# Patient Record
Sex: FEMALE | Race: OTHER | Employment: UNEMPLOYED | URBAN - METROPOLITAN AREA
[De-identification: names, ages, dates, MRNs, and addresses within clinical notes are randomized per-mention and may not be internally consistent; named-entity substitution may affect disease eponyms.]

---

## 2017-02-14 ENCOUNTER — MED REC SCAN ONLY (OUTPATIENT)
Dept: FAMILY MEDICINE CLINIC | Facility: CLINIC | Age: 37
End: 2017-02-14

## 2017-02-20 ENCOUNTER — OFFICE VISIT (OUTPATIENT)
Dept: FAMILY MEDICINE CLINIC | Facility: CLINIC | Age: 37
End: 2017-02-20

## 2017-02-20 VITALS
HEART RATE: 82 BPM | DIASTOLIC BLOOD PRESSURE: 78 MMHG | RESPIRATION RATE: 18 BRPM | OXYGEN SATURATION: 98 % | TEMPERATURE: 98 F | BODY MASS INDEX: 29 KG/M2 | SYSTOLIC BLOOD PRESSURE: 112 MMHG | WEIGHT: 165 LBS

## 2017-02-20 DIAGNOSIS — J06.9 UPPER RESPIRATORY TRACT INFECTION, UNSPECIFIED TYPE: Primary | ICD-10-CM

## 2017-02-20 DIAGNOSIS — Z72.0 TOBACCO USE: ICD-10-CM

## 2017-02-20 PROCEDURE — 99213 OFFICE O/P EST LOW 20 MIN: CPT | Performed by: NURSE PRACTITIONER

## 2017-02-20 RX ORDER — CODEINE PHOSPHATE AND GUAIFENESIN 10; 100 MG/5ML; MG/5ML
SOLUTION ORAL
Qty: 70 ML | Refills: 0 | Status: SHIPPED | OUTPATIENT
Start: 2017-02-20 | End: 2018-04-22 | Stop reason: ALTCHOICE

## 2017-02-20 RX ORDER — AMOXICILLIN AND CLAVULANATE POTASSIUM 875; 125 MG/1; MG/1
1 TABLET, FILM COATED ORAL 2 TIMES DAILY
Qty: 20 TABLET | Refills: 0 | Status: SHIPPED | OUTPATIENT
Start: 2017-02-20 | End: 2017-03-02

## 2017-02-20 RX ORDER — FLUTICASONE PROPIONATE 50 MCG
SPRAY, SUSPENSION (ML) NASAL
Qty: 1 BOTTLE | Refills: 0 | Status: SHIPPED | OUTPATIENT
Start: 2017-02-20 | End: 2018-04-22 | Stop reason: ALTCHOICE

## 2017-02-20 NOTE — PROGRESS NOTES
CHIEF COMPLAINT:   Patient presents with:  Cough: Started 1 week ago. Productive cough, nasal drainage, sinus pressure/congestion, headache, and body aches.        HPI:   Marli Gupta is a 39year old female who presents for sinus symptoms for  1 wee /78 mmHg  Pulse 82  Temp(Src) 98.2 °F (36.8 °C) (Oral)  Resp 18  Wt 165 lb  SpO2 98%  GENERAL: well developed, well nourished, and in no apparent distress  SKIN: no rashes, no suspicious lesions  HEAD: atraumatic, normocephalic, +mild tenderness on p guaiFENesin-codeine (CHERATUSSIN AC) 100-10 MG/5ML Oral Solution 70 mL 0      Sig: Take 5-10 ML PO at QHS PRN cough           Risks, benefits, side effects of medication addressed and explained.   May increase yogurt or start otc probiotic while on antibio Treatment is aimed at unblocking the sinus opening and helping the cilia work again. You may need to take antihistamine and decongestant medicine. These can reduce inflammation and decrease the amount of fluid your sinuses make.  If you have a bacterial inf

## 2017-02-20 NOTE — PATIENT INSTRUCTIONS
Acute Sinusitis    Acute sinusitis is irritation and swelling of the sinuses. It is usually caused by a viral infection after a common cold. Your doctor can help you find relief. What is acute sinusitis?   Sinuses are air-filled spaces in the skull behin © 0977-5609 [address], [address] [place]. All rights reserved. This information is not intended as a substitute for professional medical care. Always follow your healthcare professional's instructions.

## 2018-04-22 ENCOUNTER — OFFICE VISIT (OUTPATIENT)
Dept: FAMILY MEDICINE CLINIC | Facility: CLINIC | Age: 38
End: 2018-04-22

## 2018-04-22 VITALS
WEIGHT: 167 LBS | SYSTOLIC BLOOD PRESSURE: 124 MMHG | HEART RATE: 102 BPM | OXYGEN SATURATION: 97 % | HEIGHT: 63 IN | BODY MASS INDEX: 29.59 KG/M2 | TEMPERATURE: 99 F | DIASTOLIC BLOOD PRESSURE: 70 MMHG | RESPIRATION RATE: 16 BRPM

## 2018-04-22 DIAGNOSIS — L08.9 SKIN INFECTION: Primary | ICD-10-CM

## 2018-04-22 PROCEDURE — 99213 OFFICE O/P EST LOW 20 MIN: CPT | Performed by: NURSE PRACTITIONER

## 2018-04-22 RX ORDER — IBUPROFEN 200 MG
200 TABLET ORAL EVERY 6 HOURS PRN
COMMUNITY

## 2018-04-22 RX ORDER — CLINDAMYCIN HYDROCHLORIDE 300 MG/1
300 CAPSULE ORAL 3 TIMES DAILY
Qty: 30 CAPSULE | Refills: 0 | Status: SHIPPED | OUTPATIENT
Start: 2018-04-22 | End: 2018-05-02

## 2018-04-22 NOTE — PROGRESS NOTES
CHIEF COMPLAINT:   Patient presents with:  Bite Sting,Insect (integumentary): 3 days ago, left calf, redness/soreness      HPI:     Juan Gerard is a 43year old female who presents with concerns of possible skin infection to left calf, possible spi fluctuance, no drainage, no warmth to touch  HEAD: atraumatic, normocephalic  EYES: conjunctiva clear, EOM intact  NOSE: Normal external nose. No rhinorrhea. NECK: supple, non-tender  LUNGS: clear to auscultation bilaterally, no wheezes or rhonchi.  Breat

## 2019-03-19 ENCOUNTER — OFFICE VISIT (OUTPATIENT)
Dept: FAMILY MEDICINE CLINIC | Facility: CLINIC | Age: 39
End: 2019-03-19

## 2019-03-19 VITALS
OXYGEN SATURATION: 98 % | HEIGHT: 64 IN | DIASTOLIC BLOOD PRESSURE: 68 MMHG | BODY MASS INDEX: 27.69 KG/M2 | SYSTOLIC BLOOD PRESSURE: 114 MMHG | RESPIRATION RATE: 19 BRPM | HEART RATE: 81 BPM | TEMPERATURE: 98 F | WEIGHT: 162.19 LBS

## 2019-03-19 DIAGNOSIS — J01.10 ACUTE NON-RECURRENT FRONTAL SINUSITIS: Primary | ICD-10-CM

## 2019-03-19 DIAGNOSIS — J40 BRONCHITIS: ICD-10-CM

## 2019-03-19 PROCEDURE — 99213 OFFICE O/P EST LOW 20 MIN: CPT | Performed by: NURSE PRACTITIONER

## 2019-03-19 RX ORDER — CODEINE PHOSPHATE AND GUAIFENESIN 10; 100 MG/5ML; MG/5ML
5 SOLUTION ORAL NIGHTLY PRN
Qty: 100 ML | Refills: 0 | Status: SHIPPED | OUTPATIENT
Start: 2019-03-19 | End: 2019-04-02

## 2019-03-19 RX ORDER — PREDNISONE 20 MG/1
40 TABLET ORAL DAILY
Qty: 10 TABLET | Refills: 0 | Status: SHIPPED | OUTPATIENT
Start: 2019-03-19 | End: 2019-03-24

## 2019-03-19 RX ORDER — AMOXICILLIN AND CLAVULANATE POTASSIUM 875; 125 MG/1; MG/1
1 TABLET, FILM COATED ORAL 2 TIMES DAILY
Qty: 14 TABLET | Refills: 0 | Status: SHIPPED | OUTPATIENT
Start: 2019-03-19 | End: 2019-03-26

## 2019-03-19 RX ORDER — BENZONATATE 200 MG/1
200 CAPSULE ORAL 3 TIMES DAILY PRN
Qty: 60 CAPSULE | Refills: 0 | Status: SHIPPED | OUTPATIENT
Start: 2019-03-19

## 2019-03-19 RX ORDER — ALBUTEROL SULFATE 90 UG/1
2 AEROSOL, METERED RESPIRATORY (INHALATION) EVERY 4 HOURS PRN
Qty: 1 INHALER | Refills: 6 | Status: SHIPPED | OUTPATIENT
Start: 2019-03-19

## 2019-03-19 RX ORDER — CODEINE PHOSPHATE AND GUAIFENESIN 10; 100 MG/5ML; MG/5ML
5 SOLUTION ORAL NIGHTLY PRN
Qty: 100 ML | Refills: 0 | Status: SHIPPED | OUTPATIENT
Start: 2019-03-19 | End: 2019-03-19

## 2019-03-19 NOTE — PROGRESS NOTES
Patient presents with:  Nasal Congestion: tylenol , mucinex, ibuprofen   Cough: x 1 month      HPI:   Kenney Cristobal is a 43year old female who presents for cough  for 1 month. Patient cough started gradually, tight, wheezy,deep.  Reports is  worse at wet- cough, increase expiration phase to inspiratory phase. Expiratory wheezing, no rales, no crackles. Normal on percussion. No decreased BS. Normal on palpation,normal vocal fremitus.   CARDIO: RRR without murmur  GI: good BS's,no masses, HSM or tenderness

## 2019-03-19 NOTE — PATIENT INSTRUCTIONS
Viral or Bacterial Bronchitis with Wheezing (Adult)    Bronchitis is an infection of the air passages. It often occurs during a cold and is usually caused by a virus. Symptoms include cough with mucus (phlegm) and low-grade fever.  This illness is contagi · Over-the-counter cough, cold, and sore-throat medicines will not shorten the length of the illness, but they may be helpful to reduce symptoms.  (Note: Do not use decongestants if you have high blood pressure.)  · If you were given an inhaler, use it exac The sinuses are air-filled spaces within the bones of the face. They connect to the inside of the nose. Sinusitis is an inflammation of the tissue that lines the sinuses. Sinusitis can occur during a cold.  It can also happen due to allergies to pollens and · Do not use nasal rinses or irrigation during an acute sinus infection, unless your healthcare provider tells you to. Rinsing may spread the infection to other areas in your sinuses.   · Use acetaminophen or ibuprofen to control pain, unless another pain m

## 2021-10-06 ENCOUNTER — OFFICE VISIT (OUTPATIENT)
Dept: FAMILY MEDICINE CLINIC | Facility: CLINIC | Age: 41
End: 2021-10-06
Payer: MEDICAID

## 2021-10-06 VITALS
DIASTOLIC BLOOD PRESSURE: 80 MMHG | TEMPERATURE: 98 F | WEIGHT: 184 LBS | HEIGHT: 64 IN | SYSTOLIC BLOOD PRESSURE: 130 MMHG | HEART RATE: 94 BPM | OXYGEN SATURATION: 100 % | BODY MASS INDEX: 31.41 KG/M2

## 2021-10-06 DIAGNOSIS — M25.511 ACUTE PAIN OF RIGHT SHOULDER: Primary | ICD-10-CM

## 2021-10-06 PROCEDURE — 3079F DIAST BP 80-89 MM HG: CPT | Performed by: PHYSICIAN ASSISTANT

## 2021-10-06 PROCEDURE — 3008F BODY MASS INDEX DOCD: CPT | Performed by: PHYSICIAN ASSISTANT

## 2021-10-06 PROCEDURE — 3075F SYST BP GE 130 - 139MM HG: CPT | Performed by: PHYSICIAN ASSISTANT

## 2021-10-06 PROCEDURE — 99213 OFFICE O/P EST LOW 20 MIN: CPT | Performed by: PHYSICIAN ASSISTANT

## 2021-10-06 NOTE — PATIENT INSTRUCTIONS
Patient Declined AVS    Verbal Instructions given      1. OTC pain relief  2. Follow up with orthopedics ASAP  3.  If worsening symptoms seek treatment

## 2021-10-06 NOTE — PROGRESS NOTES
CHIEF COMPLAINT:     No chief complaint on file. HPI:   Roselia Lane is a 42year old female who presents with complaints of right shoulder pain for the past 4 months. The patient denies any specific injury.   The patient reports the pain is al (Skin)   Ht 5' 4\" (1.626 m)   Wt 184 lb (83.5 kg)   SpO2 100%   BMI 31.58 kg/m²   GENERAL: well developed, well nourished,in no apparent distress, cooperative   NECK: Non tender with normal ROM.   LUNGS: clear to auscultation bilaterally, no wheezes or rho

## 2024-10-26 ENCOUNTER — APPOINTMENT (OUTPATIENT)
Dept: GENERAL RADIOLOGY | Age: 44
End: 2024-10-26
Payer: MEDICAID

## 2024-10-26 ENCOUNTER — HOSPITAL ENCOUNTER (EMERGENCY)
Age: 44
Discharge: HOME | End: 2024-10-26
Attending: EMERGENCY MEDICINE
Payer: MEDICAID

## 2024-10-26 ENCOUNTER — APPOINTMENT (OUTPATIENT)
Dept: CT IMAGING | Age: 44
End: 2024-10-26
Attending: PHYSICIAN ASSISTANT
Payer: MEDICAID

## 2024-10-26 VITALS
BODY MASS INDEX: 30.11 KG/M2 | OXYGEN SATURATION: 96 % | DIASTOLIC BLOOD PRESSURE: 79 MMHG | HEIGHT: 64 IN | WEIGHT: 176.38 LBS | TEMPERATURE: 98 F | HEART RATE: 64 BPM | SYSTOLIC BLOOD PRESSURE: 119 MMHG | RESPIRATION RATE: 16 BRPM

## 2024-10-26 DIAGNOSIS — M54.50 ACUTE RIGHT-SIDED LOW BACK PAIN WITHOUT SCIATICA: Primary | ICD-10-CM

## 2024-10-26 DIAGNOSIS — N30.00 ACUTE CYSTITIS WITHOUT HEMATURIA: ICD-10-CM

## 2024-10-26 DIAGNOSIS — N20.0 KIDNEY STONE: ICD-10-CM

## 2024-10-26 LAB
ALBUMIN SERPL-MCNC: 4.1 G/DL (ref 3.4–5)
ALBUMIN/GLOB SERPL: 1 {RATIO} (ref 1–2)
ALP LIVER SERPL-CCNC: 141 U/L
ALT SERPL-CCNC: 47 U/L
ANION GAP SERPL CALC-SCNC: 4 MMOL/L (ref 0–18)
AST SERPL-CCNC: 20 U/L (ref 15–37)
B-HCG UR QL: NEGATIVE
BASOPHILS # BLD AUTO: 0.06 X10(3) UL (ref 0–0.2)
BASOPHILS NFR BLD AUTO: 0.8 %
BILIRUB SERPL-MCNC: 0.6 MG/DL (ref 0.1–2)
BILIRUB UR QL STRIP.AUTO: NEGATIVE
BUN BLD-MCNC: 10 MG/DL (ref 9–23)
CALCIUM BLD-MCNC: 9.8 MG/DL (ref 8.5–10.1)
CHLORIDE SERPL-SCNC: 99 MMOL/L (ref 98–112)
CLARITY UR REFRACT.AUTO: CLEAR
CO2 SERPL-SCNC: 31 MMOL/L (ref 21–32)
COLOR UR AUTO: YELLOW
CREAT BLD-MCNC: 0.84 MG/DL
EGFRCR SERPLBLD CKD-EPI 2021: 87 ML/MIN/1.73M2 (ref 60–?)
EOSINOPHIL # BLD AUTO: 0.3 X10(3) UL (ref 0–0.7)
EOSINOPHIL NFR BLD AUTO: 4.2 %
ERYTHROCYTE [DISTWIDTH] IN BLOOD BY AUTOMATED COUNT: 12.4 %
GLOBULIN PLAS-MCNC: 4.1 G/DL (ref 2.8–4.4)
GLUCOSE BLD-MCNC: 186 MG/DL (ref 70–99)
GLUCOSE UR STRIP.AUTO-MCNC: NEGATIVE MG/DL
HCT VFR BLD AUTO: 44.3 %
HGB BLD-MCNC: 15.3 G/DL
IMM GRANULOCYTES # BLD AUTO: 0.02 X10(3) UL (ref 0–1)
IMM GRANULOCYTES NFR BLD: 0.3 %
KETONES UR STRIP.AUTO-MCNC: NEGATIVE MG/DL
LYMPHOCYTES # BLD AUTO: 2.47 X10(3) UL (ref 1–4)
LYMPHOCYTES NFR BLD AUTO: 34.4 %
MCH RBC QN AUTO: 29.8 PG (ref 26–34)
MCHC RBC AUTO-ENTMCNC: 34.5 G/DL (ref 31–37)
MCV RBC AUTO: 86.4 FL
MONOCYTES # BLD AUTO: 0.53 X10(3) UL (ref 0.1–1)
MONOCYTES NFR BLD AUTO: 7.4 %
NEUTROPHILS # BLD AUTO: 3.8 X10 (3) UL (ref 1.5–7.7)
NEUTROPHILS # BLD AUTO: 3.8 X10(3) UL (ref 1.5–7.7)
NEUTROPHILS NFR BLD AUTO: 52.9 %
NITRITE UR QL STRIP.AUTO: POSITIVE
OSMOLALITY SERPL CALC.SUM OF ELEC: 282 MOSM/KG (ref 275–295)
PH UR STRIP.AUTO: 6.5 [PH] (ref 5–8)
PLATELET # BLD AUTO: 238 10(3)UL (ref 150–450)
POTASSIUM SERPL-SCNC: 3.8 MMOL/L (ref 3.5–5.1)
PROT SERPL-MCNC: 8.2 G/DL (ref 6.4–8.2)
PROT UR STRIP.AUTO-MCNC: NEGATIVE MG/DL
RBC # BLD AUTO: 5.13 X10(6)UL
RBC UR QL AUTO: NEGATIVE
SODIUM SERPL-SCNC: 134 MMOL/L (ref 136–145)
SP GR UR STRIP.AUTO: 1.01 (ref 1–1.03)
UROBILINOGEN UR STRIP.AUTO-MCNC: 0.2 MG/DL
WBC # BLD AUTO: 7.2 X10(3) UL (ref 4–11)

## 2024-10-26 PROCEDURE — 96361 HYDRATE IV INFUSION ADD-ON: CPT

## 2024-10-26 PROCEDURE — 80053 COMPREHEN METABOLIC PANEL: CPT | Performed by: PHYSICIAN ASSISTANT

## 2024-10-26 PROCEDURE — 87086 URINE CULTURE/COLONY COUNT: CPT | Performed by: PHYSICIAN ASSISTANT

## 2024-10-26 PROCEDURE — 72110 X-RAY EXAM L-2 SPINE 4/>VWS: CPT | Performed by: EMERGENCY MEDICINE

## 2024-10-26 PROCEDURE — 87186 SC STD MICRODIL/AGAR DIL: CPT | Performed by: PHYSICIAN ASSISTANT

## 2024-10-26 PROCEDURE — 96375 TX/PRO/DX INJ NEW DRUG ADDON: CPT

## 2024-10-26 PROCEDURE — 81015 MICROSCOPIC EXAM OF URINE: CPT | Performed by: PHYSICIAN ASSISTANT

## 2024-10-26 PROCEDURE — 85025 COMPLETE CBC W/AUTO DIFF WBC: CPT | Performed by: PHYSICIAN ASSISTANT

## 2024-10-26 PROCEDURE — 81025 URINE PREGNANCY TEST: CPT

## 2024-10-26 PROCEDURE — 87077 CULTURE AEROBIC IDENTIFY: CPT | Performed by: PHYSICIAN ASSISTANT

## 2024-10-26 PROCEDURE — 96365 THER/PROPH/DIAG IV INF INIT: CPT

## 2024-10-26 PROCEDURE — 99284 EMERGENCY DEPT VISIT MOD MDM: CPT

## 2024-10-26 PROCEDURE — 99285 EMERGENCY DEPT VISIT HI MDM: CPT

## 2024-10-26 PROCEDURE — 72110 X-RAY EXAM L-2 SPINE 4/>VWS: CPT

## 2024-10-26 PROCEDURE — 74176 CT ABD & PELVIS W/O CONTRAST: CPT | Performed by: PHYSICIAN ASSISTANT

## 2024-10-26 PROCEDURE — 81001 URINALYSIS AUTO W/SCOPE: CPT | Performed by: PHYSICIAN ASSISTANT

## 2024-10-26 RX ORDER — HYDROCODONE BITARTRATE AND ACETAMINOPHEN 5; 325 MG/1; MG/1
1-2 TABLET ORAL EVERY 6 HOURS PRN
Qty: 10 TABLET | Refills: 0 | Status: SHIPPED | OUTPATIENT
Start: 2024-10-26

## 2024-10-26 RX ORDER — HYDROMORPHONE HYDROCHLORIDE 1 MG/ML
INJECTION, SOLUTION INTRAMUSCULAR; INTRAVENOUS; SUBCUTANEOUS EVERY 30 MIN PRN
Status: DISCONTINUED | OUTPATIENT
Start: 2024-10-26 | End: 2024-10-27

## 2024-10-26 RX ORDER — HYDROMORPHONE HYDROCHLORIDE 1 MG/ML
0.5 INJECTION, SOLUTION INTRAMUSCULAR; INTRAVENOUS; SUBCUTANEOUS ONCE
Status: COMPLETED | OUTPATIENT
Start: 2024-10-26 | End: 2024-10-26

## 2024-10-26 RX ORDER — CEFPODOXIME PROXETIL 200 MG/1
200 TABLET, FILM COATED ORAL 2 TIMES DAILY
Qty: 20 TABLET | Refills: 0 | Status: SHIPPED | OUTPATIENT
Start: 2024-10-26 | End: 2024-11-05

## 2024-10-26 RX ORDER — ONDANSETRON 2 MG/ML
4 INJECTION INTRAMUSCULAR; INTRAVENOUS EVERY 4 HOURS PRN
Status: DISCONTINUED | OUTPATIENT
Start: 2024-10-26 | End: 2024-10-27

## 2024-10-26 RX ORDER — KETOROLAC TROMETHAMINE 30 MG/ML
30 INJECTION, SOLUTION INTRAMUSCULAR; INTRAVENOUS ONCE
Status: COMPLETED | OUTPATIENT
Start: 2024-10-26 | End: 2024-10-26

## 2024-10-27 NOTE — ED PROVIDER NOTES
Patient Seen in: [facility] In [place]      History     Chief Complaint   Patient presents with    Back Pain     Stated Complaint: right flank / back pain, worse with movement    Subjective:   HPI    45 YO female presents to emergency department for evaluation of right low back pain that radiates to right flank starting 2 days ago. Worse with movement. Reports history of kidney stones but states recent pain is not as severe as that. Denies urinary symptoms or fever. Ibuprofen 800 mg has not helped.         Objective:     History reviewed. No pertinent past medical history.           Past Surgical History:   Procedure Laterality Date    Hysterectomy  2009                Social History     Socioeconomic History    Marital status:    Tobacco Use    Smoking status: Some Days    Smokeless tobacco: Never   Substance and Sexual Activity    Alcohol use: Yes    Drug use: No                  Physical Exam     ED Triage Vitals [10/26/24 1752]   BP (!) 164/106   Pulse 90   Resp 18   Temp 98.2 °F (36.8 °C)   Temp src Oral   SpO2 100 %   O2 Device None (Room air)       Current Vitals:   No data recorded      Physical Exam  Vitals and nursing note reviewed.   Constitutional:       General: She is not in acute distress.     Appearance: Normal appearance. She is not ill-appearing, toxic-appearing or diaphoretic.   Cardiovascular:      Rate and Rhythm: Normal rate.   Pulmonary:      Effort: Pulmonary effort is normal. No respiratory distress.   Abdominal:      Palpations: Abdomen is soft.      Tenderness: There is no abdominal tenderness. There is no guarding.   Musculoskeletal:      Lumbar back: Tenderness (right) present.   Neurological:      Mental Status: She is alert and oriented to person, place, and time.   Psychiatric:         Behavior: Behavior normal.          ED Course     Labs Reviewed   COMP METABOLIC PANEL (14) - Abnormal; Notable for the following components:       Result Value    Glucose  186 (*)     Sodium 134 (*)     Alkaline Phosphatase 141 (*)     All other components within normal limits   URINALYSIS WITH CULTURE REFLEX - Abnormal; Notable for the following components:    Nitrite Urine Positive (*)     Leukocyte Esterase Urine Small (*)     All other components within normal limits   UA MICROSCOPIC ONLY, URINE - Abnormal; Notable for the following components:    WBC Urine 6-10 (*)     Bacteria Urine 3+ (*)     Squamous Epi. Cells Few (*)     All other components within normal limits   URINE CULTURE, ROUTINE - Abnormal; Notable for the following components:    Urine Culture >100,000 CFU/ML Klebsiella pneumoniae (*)     All other components within normal limits   POCT PREGNANCY URINE - Normal   CBC WITH DIFFERENTIAL WITH PLATELET     CT ABDOMEN+PELVIS KIDNEYSTONE 2D RNDR(NO IV,NO ORAL)(CPT=74176)    Result Date: 10/26/2024  PROCEDURE:  CT ABDOMEN+PELVIS KIDNEYSTONE 2D RNDR(NO IV,NO ORAL)(CPT=74176)  COMPARISON:  [facility], XR, XR LUMBAR SPINE (MIN 4 VIEWS) (CPT=72110), 10/26/2024, 6:46 PM.  INDICATIONS:  right flank / back pain, worse with movement  TECHNIQUE:  Unenhanced multislice CT scanning from above the kidneys to below the urinary bladder.  2D rendering are generated on the CT scanner workstation to localize potential stones in the cranio-caudal plane.  Dose reduction techniques were used. Dose information is transmitted to the ACR (American College of Radiology) NRDR (National Radiology Data Registry) which includes the Dose Index Registry.  PATIENT STATED HISTORY: (As transcribed by Technologist)   right flank / back pain.    FINDINGS:  KIDNEYS:  No hydronephrosis.  There is a 2 mm nonobstructing calculus midpole left kidney.  No definite mass on this study without contrast BLADDER:  The bladder is empty. ADRENALS:  No mass or enlargement.  LIVER:  No enlargement, atrophy, abnormal density, or significant focal lesion.  BILIARY:  No visible dilatation or calcification.  PANCREAS:  No  lesion, fluid collection, ductal dilatation, or atrophy.  SPLEEN:  No enlargement or focal lesion.  AORTA/VASCULAR:  No aneurysm.  RETROPERITONEUM:  No mass or adenopathy.  BOWEL/MESENTERY:  No free air.  No free fluid.  No bowel obstruction.  There is some gaseous distension of the transverse colon and some stool in the right colon moderate in degree.  The terminal ileum is unremarkable.  There is some mild wall thickening  involving the proximal to mid jejunum may represent enteritis.  The appendix is normal. ABDOMINAL WALL:  No mass or hernia.  BONES:  No bony lesion or fracture. PELVIC ORGANS:  Status post hysterectomy LUNG BASES:  No visible pulmonary or pleural disease.  OTHER:  Negative.             CONCLUSION:  1. There is a 2 mm nonobstructing calculus midpole left kidney.  No hydronephrosis.  The bladder is empty.  2. Mild circumferential wall thickening of the jejunum without transition zone suggesting enteritis.  There is mild gaseous distension of the transverse colon with a moderate amount of retained stool in the right colon may represent associated ileus/enteritis.  Normal appendix.     LOCATION:  [facility]   Dictated by (CST): Freddy Youssef MD on 10/26/2024 at 9:54 PM     Finalized by (CST): Freddy Youssef MD on 10/26/2024 at 10:00 PM       XR LUMBAR SPINE (MIN 4 VIEWS) (CPT=72110)    Result Date: 10/26/2024  PROCEDURE:  XR LUMBAR SPINE (MIN 4 VIEWS) (CPT=72110)  TECHNIQUE:  AP, lateral, oblique, and coned down L5-S1 views were obtained.  COMPARISON:  None.  INDICATIONS:  right flank / back pain, worse with movement  PATIENT STATED HISTORY: (As transcribed by Technologist)  Pt c/o right sided lower lumbar pain worse when moving. Pt denies any recent injury or trauma.    FINDINGS:    BONES:  Normal.  No significant spondylosis, scoliosis, fracture, or visible bony lesion. DISC SPACES:  Normal.  No significant disc height narrowing, subluxation, or endplate abnormality. PARASPINOUS:  Negative.   No paraspinous abnormality is seen. OTHER:  Moderate amount of stool in the right and proximal transverse colon with mild gaseous distension of the distal transverse colon and proximal left colon.            CONCLUSION:  No fracture.  Moderate amount of retained stool.   LOCATION:  [facility]   Dictated by (CST): Freddy Youssef MD on 10/26/2024 at 7:23 PM     Finalized by (CST): Freddy Youssef MD on 10/26/2024 at 7:24 PM           MDM      Differential diagnosis considered but not limited to lumbar strain, nephrolithiasis, less likely pyelonephritis    Afebrile and non-toxic in appearance. Mechanical right low back pain. No midline tenderness. XR lumbar spine ordered in triage is negative for acute osseous findings. There is a moderate amount of retained stool in the right and proximal transverse colon.   UA has small leukocyte esterase and positive nitrites.  No blood.    CT abdomen/pelvis remarkable for 2 mm calculus at the left kidney that is nonobstructing.  Mild gaseous distention of the transverse colon with a moderate amount of retained stool in the right colon may represent associated ileus/enteritis.  Normal appendix.  All results were discussed with patient. Subjective improvement after IV fluids, Toradol then Dilaudid.  She received 2 g Rocephin IV here and discharged with Cefpodoxime for acute cystitis. Urine culture pending.         Medical Decision Making  Amount and/or Complexity of Data Reviewed  Labs: ordered. Decision-making details documented in ED Course.  Radiology: ordered and independent interpretation performed. Decision-making details documented in ED Course.        Disposition and Plan     Clinical Impression:  1. Acute right-sided low back pain without sciatica    2. Kidney stone    3. Acute cystitis without hematuria         Disposition:  Discharge  10/26/2024 11:01 pm    Follow-up:  [facility] in [place]  [id number] [place] [address]  [id number]  [phone]  Follow up  If symptoms worsen    Warren Arceo MD  [address] [place] [id number]  [phone]    Schedule an appointment as soon as possible for a visit            Medications Prescribed:  Discharge Medication List as of 10/26/2024 11:14 PM        START taking these medications    Details   cefpodoxime 200 MG Oral Tab Take 1 tablet (200 mg total) by mouth 2 (two) times daily for 10 days., Normal, Disp-20 tablet, R-0      HYDROcodone-acetaminophen 5-325 MG Oral Tab Take 1-2 tablets by mouth every 6 (six) hours as needed for Pain., Normal, Disp-10 tablet, R-0                 Supplementary Documentation:

## 2024-11-12 ENCOUNTER — OFFICE VISIT (OUTPATIENT)
Dept: FAMILY MEDICINE CLINIC | Facility: CLINIC | Age: 44
End: 2024-11-12
Payer: MEDICAID

## 2024-11-12 VITALS
SYSTOLIC BLOOD PRESSURE: 106 MMHG | OXYGEN SATURATION: 99 % | DIASTOLIC BLOOD PRESSURE: 82 MMHG | HEART RATE: 99 BPM | TEMPERATURE: 99 F | WEIGHT: 177 LBS | HEIGHT: 64 IN | BODY MASS INDEX: 30.22 KG/M2 | RESPIRATION RATE: 16 BRPM

## 2024-11-12 DIAGNOSIS — M54.50 ACUTE RIGHT-SIDED LOW BACK PAIN WITHOUT SCIATICA: Primary | ICD-10-CM

## 2024-11-12 LAB
APPEARANCE: CLEAR
BILIRUBIN: NEGATIVE
GLUCOSE (URINE DIPSTICK): NEGATIVE MG/DL
KETONES (URINE DIPSTICK): NEGATIVE MG/DL
LEUKOCYTES: NEGATIVE
MULTISTIX LOT#: NORMAL NUMERIC
NITRITE, URINE: NEGATIVE
OCCULT BLOOD: NEGATIVE
PH, URINE: 7 (ref 4.5–8)
PROTEIN (URINE DIPSTICK): NEGATIVE MG/DL
SPECIFIC GRAVITY: 1.02 (ref 1–1.03)
URINE-COLOR: YELLOW
UROBILINOGEN,SEMI-QN: 1 MG/DL (ref 0–1.9)

## 2024-11-12 PROCEDURE — 99203 OFFICE O/P NEW LOW 30 MIN: CPT | Performed by: NURSE PRACTITIONER

## 2024-11-12 PROCEDURE — 81003 URINALYSIS AUTO W/O SCOPE: CPT | Performed by: NURSE PRACTITIONER

## 2024-11-12 NOTE — PROGRESS NOTES
CHIEF COMPLAINT:     Chief Complaint   Patient presents with    Urinary Symptoms     C/o low back R side pain, finished abx prescribed 10/26/24 last week Tuesday   Denies urinary sx        HPI:   Gaby Childs is a 44 year old female who is here for complaints of right sided low back pain. Pain started 3-4 days ago, was worse yesterday, better today. Started after lifting something over head. Has increased pain with lifting, forward bend, and twisting. Pain is aching and currently rates as 4/10 in severity.   Pt was concerned as she was in ED on 10.26, diagnosed with urinary tract infection, started on antibiotic. She had right sided back pain at that time as well. She states the back pain seemed to improve after antibiotic, but then returned 3-4 days ago. Was uncertain if the urinary tract infection was returning or if the pain was due to muscular etiology. She had previously been treating the pain with ibuprofen, but has not used anything for pain the past few days. Denies any urinary frequency, urgency, hematuria, or dysuria.   Denies janel loss of bowel or bladder control.    Current Outpatient Medications   Medication Sig Dispense Refill    HYDROcodone-acetaminophen 5-325 MG Oral Tab Take 1-2 tablets by mouth every 6 (six) hours as needed for Pain. 10 tablet 0    benzonatate 200 MG Oral Cap Take 1 capsule (200 mg total) by mouth 3 (three) times daily as needed for cough. (Patient not taking: Reported on 10/26/2024) 60 capsule 0    Albuterol Sulfate HFA (PROAIR HFA) 108 (90 Base) MCG/ACT Inhalation Aero Soln Inhale 2 puffs into the lungs every 4 (four) hours as needed for Wheezing. (Patient not taking: Reported on 10/26/2024) 1 Inhaler 6    ibuprofen 200 MG Oral Tab Take 1 tablet (200 mg total) by mouth every 6 (six) hours as needed for Pain.        No past medical history on file.   Social History:  Social History     Socioeconomic History    Marital status:    Tobacco Use    Smoking status: Some  Days    Smokeless tobacco: Never   Substance and Sexual Activity    Alcohol use: Yes    Drug use: No        REVIEW OF SYSTEMS:   GENERAL: feels well otherwise  SKIN: denies any unusual skin lesions  LUNGS: denies shortness of breath   CARDIOVASCULAR: denies chest pain or palpitations  GI: denies abdominal pain, N/VC/D.  Denies heartburn  : no dysuria, urgency or flank pain.  MUSCULOSKELETAL: Per HPI.  No other joints are affected  NEURO: No numbness or tingling.  No loss of bowel or bladder control.    EXAM:   /82   Pulse 99   Temp 98.8 °F (37.1 °C)   Resp 16   Ht 5' 4\" (1.626 m)   Wt 177 lb (80.3 kg)   SpO2 99%   BMI 30.38 kg/m²    GENERAL: well developed, well nourished,in no apparent distress  SKIN: no rashes,no suspicious lesions  NECK: supple,no adenopathy,no bruits  LUNGS: clear to auscultation  CARDIO: RRR without murmur  GI: normoactive bs x4, no masses, HSM or tenderness  EXTREMITIES: no cyanosis, clubbing or edema  BACK: + tenderness to palpation of right lumbar sacral area. Full active ROM of torso. Increased pain with rightward twisting and forward bend.   NEURO: bilateral patellar DTR's intact and equal bilaterally.  Sensation intact.    Recent Results (from the past 24 hours)   URINALYSIS, AUTO, W/O SCOPE    Collection Time: 11/12/24  4:31 PM   Result Value Ref Range    Glucose Urine Negative Negative mg/dL    Bilirubin Urine Negative Negative    Ketones, UA Negative Negative - Trace mg/dL    Spec Gravity 1.025 1.005 - 1.030    Blood Urine Negative Negative    PH Urine 7.0 5.0 - 8.0    Protein Urine Negative Negative - Trace mg/dL    Urobilinogen Urine 1.0 0.2 - 1.0 mg/dL    Nitrite Urine Negative Negative    Leukocyte Esterase Urine Negative Negative    APPEARANCE clear Clear    Color Urine yellow Yellow    Multistix Lot# [id number] Numeric    Multistix Expiration Date 5/31/25 Date      ASSESSMENT:   Gaby Childs is a 44 year old female who presents with complaints of back pain    Findings are consistent with   Encounter Diagnosis   Name Primary?    Acute right-sided low back pain without sciatica Yes         PLAN:   UA negative. Exam findings consistent with musculoskeletal etiology. Discussed comfort measures. Advised follow up with pcp if not improving over the next week. May benefit from PT as well.   Advised to seek urgent follow up for any new/ worsening symptoms  Requested Prescriptions      No prescriptions requested or ordered in this encounter     Risks, benefits, side effects of medication explained and discussed.     Patient Instructions   May continue ibuprofen for pain if needed  Heat to the affected area for 10-15 minutes 3 times daily may be helpful.   Follow up with your primary care doctor if not improving over the next week  Seek urgent follow up for new/ worsening symptoms    The patient indicates understanding of these issues and agrees to the plan.  The patient is asked to return if sx's persist or worsen.

## 2024-11-12 NOTE — PATIENT INSTRUCTIONS
May continue ibuprofen for pain if needed  Heat to the affected area for 10-15 minutes 3 times daily may be helpful.   Follow up with your primary care doctor if not improving over the next week  Seek urgent follow up for new/ worsening symptoms

## 2025-01-22 ENCOUNTER — OFFICE VISIT (OUTPATIENT)
Dept: INTERNAL MEDICINE CLINIC | Facility: CLINIC | Age: 45
End: 2025-01-22
Payer: MEDICAID

## 2025-01-22 ENCOUNTER — LAB ENCOUNTER (OUTPATIENT)
Dept: LAB | Age: 45
End: 2025-01-22
Attending: INTERNAL MEDICINE
Payer: MEDICAID

## 2025-01-22 VITALS
DIASTOLIC BLOOD PRESSURE: 80 MMHG | RESPIRATION RATE: 16 BRPM | BODY MASS INDEX: 30.94 KG/M2 | TEMPERATURE: 98 F | HEART RATE: 77 BPM | OXYGEN SATURATION: 98 % | WEIGHT: 176.81 LBS | SYSTOLIC BLOOD PRESSURE: 130 MMHG | HEIGHT: 63.5 IN

## 2025-01-22 DIAGNOSIS — Z00.00 LABORATORY EXAM ORDERED AS PART OF ROUTINE GENERAL MEDICAL EXAMINATION: ICD-10-CM

## 2025-01-22 DIAGNOSIS — R73.01 ELEVATED FASTING GLUCOSE: ICD-10-CM

## 2025-01-22 DIAGNOSIS — Z00.00 ENCOUNTER FOR ROUTINE ADULT MEDICAL EXAMINATION: Primary | ICD-10-CM

## 2025-01-22 DIAGNOSIS — Z12.31 ENCOUNTER FOR SCREENING MAMMOGRAM FOR BREAST CANCER: ICD-10-CM

## 2025-01-22 DIAGNOSIS — G47.00 INSOMNIA, UNSPECIFIED TYPE: ICD-10-CM

## 2025-01-22 DIAGNOSIS — Z12.11 SCREENING FOR COLON CANCER: ICD-10-CM

## 2025-01-22 DIAGNOSIS — Z23 NEED FOR TDAP VACCINATION: ICD-10-CM

## 2025-01-22 LAB
ALBUMIN SERPL-MCNC: 4.9 G/DL (ref 3.2–4.8)
ALBUMIN/GLOB SERPL: 1.6 {RATIO} (ref 1–2)
ALP LIVER SERPL-CCNC: 142 U/L
ALT SERPL-CCNC: 32 U/L
ANION GAP SERPL CALC-SCNC: 8 MMOL/L (ref 0–18)
AST SERPL-CCNC: 23 U/L (ref ?–34)
BASOPHILS # BLD AUTO: 0.06 X10(3) UL (ref 0–0.2)
BASOPHILS NFR BLD AUTO: 1 %
BILIRUB SERPL-MCNC: 0.5 MG/DL (ref 0.3–1.2)
BUN BLD-MCNC: 12 MG/DL (ref 9–23)
CALCIUM BLD-MCNC: 10.2 MG/DL (ref 8.7–10.6)
CHLORIDE SERPL-SCNC: 99 MMOL/L (ref 98–112)
CHOLEST SERPL-MCNC: 250 MG/DL (ref ?–200)
CO2 SERPL-SCNC: 29 MMOL/L (ref 21–32)
CREAT BLD-MCNC: 0.86 MG/DL
EGFRCR SERPLBLD CKD-EPI 2021: 85 ML/MIN/1.73M2 (ref 60–?)
EOSINOPHIL # BLD AUTO: 0.28 X10(3) UL (ref 0–0.7)
EOSINOPHIL NFR BLD AUTO: 4.6 %
ERYTHROCYTE [DISTWIDTH] IN BLOOD BY AUTOMATED COUNT: 12.7 %
FASTING PATIENT LIPID ANSWER: YES
FASTING STATUS PATIENT QL REPORTED: YES
GLOBULIN PLAS-MCNC: 3.1 G/DL (ref 2–3.5)
GLUCOSE BLD-MCNC: 258 MG/DL (ref 70–99)
HCT VFR BLD AUTO: 46.6 %
HDLC SERPL-MCNC: 56 MG/DL (ref 40–59)
HGB BLD-MCNC: 15.9 G/DL
IMM GRANULOCYTES # BLD AUTO: 0.02 X10(3) UL (ref 0–1)
IMM GRANULOCYTES NFR BLD: 0.3 %
LDLC SERPL CALC-MCNC: 164 MG/DL (ref ?–100)
LYMPHOCYTES # BLD AUTO: 1.48 X10(3) UL (ref 1–4)
LYMPHOCYTES NFR BLD AUTO: 24.3 %
MCH RBC QN AUTO: 29.9 PG (ref 26–34)
MCHC RBC AUTO-ENTMCNC: 34.1 G/DL (ref 31–37)
MCV RBC AUTO: 87.8 FL
MONOCYTES # BLD AUTO: 0.48 X10(3) UL (ref 0.1–1)
MONOCYTES NFR BLD AUTO: 7.9 %
NEUTROPHILS # BLD AUTO: 3.77 X10 (3) UL (ref 1.5–7.7)
NEUTROPHILS # BLD AUTO: 3.77 X10(3) UL (ref 1.5–7.7)
NEUTROPHILS NFR BLD AUTO: 61.9 %
NONHDLC SERPL-MCNC: 194 MG/DL (ref ?–130)
OSMOLALITY SERPL CALC.SUM OF ELEC: 291 MOSM/KG (ref 275–295)
PLATELET # BLD AUTO: 186 10(3)UL (ref 150–450)
POTASSIUM SERPL-SCNC: 4.3 MMOL/L (ref 3.5–5.1)
PROT SERPL-MCNC: 8 G/DL (ref 5.7–8.2)
RBC # BLD AUTO: 5.31 X10(6)UL
SODIUM SERPL-SCNC: 136 MMOL/L (ref 136–145)
TRIGL SERPL-MCNC: 166 MG/DL (ref 30–149)
TSI SER-ACNC: 0.87 UIU/ML (ref 0.55–4.78)
VLDLC SERPL CALC-MCNC: 33 MG/DL (ref 0–30)
WBC # BLD AUTO: 6.1 X10(3) UL (ref 4–11)

## 2025-01-22 PROCEDURE — 84443 ASSAY THYROID STIM HORMONE: CPT

## 2025-01-22 PROCEDURE — 80053 COMPREHEN METABOLIC PANEL: CPT

## 2025-01-22 PROCEDURE — 80061 LIPID PANEL: CPT

## 2025-01-22 PROCEDURE — 85025 COMPLETE CBC W/AUTO DIFF WBC: CPT

## 2025-01-22 PROCEDURE — 90715 TDAP VACCINE 7 YRS/> IM: CPT | Performed by: INTERNAL MEDICINE

## 2025-01-22 PROCEDURE — 99386 PREV VISIT NEW AGE 40-64: CPT | Performed by: INTERNAL MEDICINE

## 2025-01-22 PROCEDURE — 83036 HEMOGLOBIN GLYCOSYLATED A1C: CPT

## 2025-01-22 PROCEDURE — 90471 IMMUNIZATION ADMIN: CPT | Performed by: INTERNAL MEDICINE

## 2025-01-22 PROCEDURE — 36415 COLL VENOUS BLD VENIPUNCTURE: CPT

## 2025-01-22 RX ORDER — TRAZODONE HYDROCHLORIDE 50 MG/1
TABLET, FILM COATED ORAL NIGHTLY
Qty: 30 TABLET | Refills: 0 | Status: SHIPPED | OUTPATIENT
Start: 2025-01-22

## 2025-01-22 NOTE — PATIENT INSTRUCTIONS
Blood work today     Trazodone - take a half tablet nightly as needed. If  not effective, ok to increase to 50mg which is a full tablet   Let me know how you are doing with the medication and I can send through for a refill     [facility] to schedule [facility]     Call to schedule appointment for colonoscopy with gastroenterology     Tdap today 1/22/2025

## 2025-01-22 NOTE — PROGRESS NOTES
[facility] Internal Medicine Office Note  Chief Complaint:   Chief Complaint   Patient presents with    New Patient    Physical       HPI:   This is a 44 year old female coming in for establishing care   HPI  No significant PMH    PSH: hysterectomy in 2009 for endometriosis and pre-cancerous cervical cells   Tonsillectomy    She last went to the doctor 6 years ago    Fingers turn white when exposed to cold but not painful     Due for Tdap    Patient Active Problem List   Diagnosis    Tobacco use    Right acute serous otitis media    Conductive hearing loss in right ear     Past Surgical History:   Procedure Laterality Date    Hysterectomy  2009     Family History   Problem Relation Age of Onset    Breast Cancer Paternal Aunt     Depression Mother     Hypertension Mother     Obesity Mother     Psychiatric Mother     Diabetes Father     Heart Disorder Father     Hypertension Brother         I reviewed her's Past Medical History, Past Surgical History, Family History and   Social History updated shows  Social History     Socioeconomic History    Marital status: Engaged   Tobacco Use    Smoking status: Former     Types: Cigarettes     Start date: 1/1/2023    Smokeless tobacco: Never   Vaping Use    Vaping status: Every Day    Substances: Nicotine, Flavoring    Devices: Disposable   Substance and Sexual Activity    Alcohol use: Yes     Comment: rare    Drug use: No     Social Drivers of Health     Food Insecurity: No Food Insecurity (1/22/2025)    NCSS - Food Insecurity     Worried About Running Out of Food in the Last Year: No     Ran Out of Food in the Last Year: No   Transportation Needs: No Transportation Needs (1/22/2025)    NCSS - Transportation     Lack of Transportation: No   Housing Stability: Not At Risk (1/22/2025)    NCSS - Housing/Utilities     Has Housing: Yes     Worried About Losing Housing: No     Unable to Get Utilities: No     Allergies:  Allergies[1]  Current Outpatient Medications    Medication Sig Dispense Refill    traZODone 50 MG Oral Tab Take 0.5-1 tablets (25-50 mg total) by mouth nightly. 30 tablet 0    ibuprofen 200 MG Oral Tab Take 1 tablet (200 mg total) by mouth every 6 (six) hours as needed for Pain.           REVIEW OF SYSTEMS:   Review of Systems   Constitutional:  Negative for fever.   HENT:  Negative for congestion.    Eyes:  Negative for visual disturbance.   Respiratory:  Negative for shortness of breath.    Cardiovascular:  Negative for chest pain.   Gastrointestinal:  Negative for constipation.   Genitourinary:  Negative for dysuria.   Neurological: Negative.    Hematological: Negative.    Psychiatric/Behavioral: Negative.          EXAM:   /80   Pulse 77   Temp 97.9 °F (36.6 °C) (Temporal)   Resp 16   Ht 5' 3.5\" (1.613 m)   Wt 176 lb 12.8 oz (80.2 kg)   SpO2 98%   BMI 30.83 kg/m²  Estimated body mass index is 30.83 kg/m² as calculated from the following:    Height as of this encounter: 5' 3.5\" (1.613 m).    Weight as of this encounter: 176 lb 12.8 oz (80.2 kg).   Vital signs reviewed. Appears stated age, well groomed.  Physical Exam  Vitals reviewed.   Constitutional:       General: She is not in acute distress.     Appearance: She is well-developed.   HENT:      Head: Normocephalic and atraumatic.      Right Ear: Tympanic membrane normal.      Left Ear: Tympanic membrane normal.   Eyes:      Conjunctiva/sclera: Conjunctivae normal.   Cardiovascular:      Rate and Rhythm: Normal rate and regular rhythm.      Heart sounds: Normal heart sounds.   Pulmonary:      Effort: Pulmonary effort is normal.      Breath sounds: Normal breath sounds.   Abdominal:      Palpations: Abdomen is soft.      Tenderness: There is no abdominal tenderness.   Musculoskeletal:      Cervical back: Neck supple.      Right lower leg: No edema.      Left lower leg: No edema.   Skin:     General: Skin is warm and dry.   Neurological:      General: No focal deficit present.      Mental Status:  She is alert.   Psychiatric:         Mood and Affect: Mood normal.          ASSESSMENT AND PLAN:   Gaby Childs is a 44 year old female with  1. Encounter for routine adult medical examination    2. Laboratory exam ordered as part of routine general medical examination    3. Encounter for screening mammogram for breast cancer    4. Screening for colon cancer    5. Need for Tdap vaccination    6. Insomnia, unspecified type          The plan is as follows  Gaby was seen today for new patient and physical.    Diagnoses and all orders for this visit:    Encounter for routine adult medical examination  -Due for mammogram and colonoscopy.  Discussed procedure and referred to gastroenterology   -due for Tdap      Laboratory exam ordered as part of routine general medical examination  -     CBC With Differential With Platelet; Future  -     Comp Metabolic Panel (14); Future  -     Lipid Panel; Future  -     TSH W Reflex To Free T4; Future    Encounter for screening mammogram for breast cancer  -     [facility] SHELLY 2D+3D SCREENING BILAT (CPT=77067/45694); Future    Screening for colon cancer  -     Gastro Referral - External    Need for Tdap vaccination  -     TdaP (Adacel, Boostrix) [42398]    Insomnia, unspecified type -discussed sleep hygiene.  Plan to move up intended bedtime by 15 minutes every 5 days.  If cannot fall back asleep, get out of bed and do a quiet activity and get back into bed when feeling drowsy.  May have to repeat the process.  She is interested in a sleep aid.  Discussed trazodone side effects.  Take half tablet at bedtime as needed.  Can increase to full tablet if not effective.  Instructed patient to let me know how you are doing with the medication and will send refill in future    Other orders  -     traZODone 50 MG Oral Tab; Take 0.5-1 tablets (25-50 mg total) by mouth nightly.        Orders Placed This Encounter   Procedures    CBC With Differential With Platelet    Comp Metabolic Panel (14)     Lipid Panel    TSH W Reflex To Free T4    TdaP (Adacel, Boostrix) [[id number]]       Meds & Refills for this Visit:  Requested Prescriptions     Signed Prescriptions Disp Refills    traZODone 50 MG Oral Tab 30 tablet 0     Sig: Take 0.5-1 tablets (25-50 mg total) by mouth nightly.       Imaging & Consults:  GASTRO - EXTERNAL  TETANUS, DIPHTHERIA TOXOIDS AND ACELLULAR PERTUSIS VACCINE (TDAP), >7 YEARS, IM USE  [facility] SHELLY 2D+3D SCREENING BILAT (CPT=77067/99053)    Health Maintenance Due   Topic Date Due    Annual Physical  Never done    Colorectal Cancer Screening  Never done    Mammogram  Never done    DTaP,Tdap,and Td Vaccines (1 - Tdap) Never done    COVID-19 Vaccine (1 - 2024-25 season) Never done    Influenza Vaccine (1) Never done    Annual Depression Screening  01/01/2025    Tobacco Cessation Counseling  Never done     Patient/Caregiver Education: Patient/Caregiver Education: There are no barriers to learning. Medical education done. Outcome: Patient verbalizes understanding. Patient is notified to call with any questions, complications, allergies, or worsening or changing symptoms.  Patient is to call with any side effects or complications from the treatments as a result of today.     Jemma Holt MD       [1]   Allergies  Allergen Reactions    Aspirin SWELLING    Sulfa Antibiotics

## 2025-01-23 DIAGNOSIS — R74.8 ELEVATED ALKALINE PHOSPHATASE LEVEL: ICD-10-CM

## 2025-01-23 DIAGNOSIS — R73.01 ELEVATED FASTING GLUCOSE: Primary | ICD-10-CM

## 2025-01-23 LAB
EST. AVERAGE GLUCOSE BLD GHB EST-MCNC: 237 MG/DL (ref 68–126)
HBA1C MFR BLD: 9.9 % (ref ?–5.7)

## 2025-01-24 ENCOUNTER — TELEPHONE (OUTPATIENT)
Dept: INTERNAL MEDICINE CLINIC | Facility: CLINIC | Age: 45
End: 2025-01-24

## 2025-01-24 ENCOUNTER — PATIENT MESSAGE (OUTPATIENT)
Dept: INTERNAL MEDICINE CLINIC | Facility: CLINIC | Age: 45
End: 2025-01-24

## 2025-01-24 DIAGNOSIS — E11.9 TYPE 2 DIABETES MELLITUS WITHOUT COMPLICATION, WITHOUT LONG-TERM CURRENT USE OF INSULIN (HCC): Primary | ICD-10-CM

## 2025-01-24 NOTE — TELEPHONE ENCOUNTER
Please call patient to schedule appointment for in 1 month for new diagnosis of diabetes.  Okay to use respiratory slot   Subjective:   Chief Complaint   Patient presents with   • Follow-up     3 month f/u  Meds going well, no bad side effects  Review A1C from 3/6/23 = 6 6   Due for Physical, Pt states will curtis for 3 months on Wed at check-out  Pt requested to get done the annual BW panel again to go over how everything is  Pt refused kidney health eval        Patient ID: Law Carlin is a 66 y o  male  Here for check up  a1c is up to 6 6      The following portions of the patient's history were reviewed and updated as appropriate: allergies, current medications, past family history, past medical history, past social history, past surgical history and problem list     Review of Systems   Constitutional: Negative for chills and fever  HENT: Negative for ear pain and sore throat  Eyes: Negative for pain and visual disturbance  Respiratory: Negative for cough and shortness of breath  Cardiovascular: Negative for chest pain and palpitations  Gastrointestinal: Negative for abdominal pain and vomiting  Genitourinary: Negative for dysuria and hematuria  Musculoskeletal: Negative for arthralgias and back pain  Skin: Negative for color change and rash  Neurological: Negative for seizures and syncope  All other systems reviewed and are negative  Objective:  Vitals:    03/08/23 0956   Pulse: 74   Temp: (!) 96 5 °F (35 8 °C)   SpO2: 98%   Weight: 103 kg (228 lb 1 9 oz)   Height: 6' 1 5" (1 867 m)      Physical Exam  Constitutional:       General: He is not in acute distress  Appearance: He is well-developed  He is not diaphoretic  HENT:      Head: Normocephalic and atraumatic  Right Ear: External ear normal       Left Ear: External ear normal       Nose: Nose normal       Mouth/Throat:      Pharynx: No oropharyngeal exudate  Eyes:      General: No scleral icterus  Right eye: No discharge  Left eye: No discharge        Conjunctiva/sclera: Conjunctivae normal       Pupils: Pupils are equal, round, and reactive to light  Neck:      Thyroid: No thyromegaly  Cardiovascular:      Rate and Rhythm: Normal rate and regular rhythm  Heart sounds: Normal heart sounds  No murmur heard  Pulmonary:      Effort: Pulmonary effort is normal       Breath sounds: Normal breath sounds  No wheezing or rales  Abdominal:      General: Bowel sounds are normal       Palpations: Abdomen is soft  There is no mass  Tenderness: There is no abdominal tenderness  There is no guarding  Musculoskeletal:         General: No tenderness  Normal range of motion  Cervical back: Normal range of motion and neck supple  Lymphadenopathy:      Cervical: No cervical adenopathy  Skin:     General: Skin is warm and dry  Neurological:      Mental Status: He is alert and oriented to person, place, and time  Deep Tendon Reflexes: Reflexes are normal and symmetric  Psychiatric:         Thought Content: Thought content normal          Judgment: Judgment normal            Assessment/Plan:    No problem-specific Assessment & Plan notes found for this encounter  Diagnoses and all orders for this visit:    Type 2 diabetes mellitus with diabetic neuropathy, without long-term current use of insulin (HCC)    Benign essential hypertension  -     CBC and differential; Future  -     Comprehensive metabolic panel; Future  -     CBC and differential  -     Comprehensive metabolic panel    Mixed hyperlipidemia  -     Lipid Panel with Direct LDL reflex; Future  -     TSH, 3rd generation with Free T4 reflex; Future  -     Lipid Panel with Direct LDL reflex  -     TSH, 3rd generation with Free T4 reflex    Prostate cancer screening  -     PSA, total and free; Future  -     PSA, total and free    Other orders  -     Probiotic Product (PROBIOTIC-10 PO); Take by mouth  -     Omega-3 Fatty Acids (OMEGA-3 FISH OIL PO); Take 2 capsules by mouth  -     Liver Extract (LIVER PO);  Take by mouth  -     Specialty Vitamins Products (BRAIN PO); Take by mouth Ideal Brain supplement  -     Multiple Vitamins-Minerals (VISION HEALTH PO); Take 2 capsules by mouth Bold Vision Supplement  -     THYROID PO; Take 4 capsules by mouth "Thyroid Activator"  -     Cholecalciferol 125 MCG (5000 UT) TABS; Take by mouth        Bring your diary with you which shows fasting 4 PM or bedtime Accu-Cheks  Continue with 4 tablets of metformin daily  Go back on a low carbohydrate diet    Recheck with me in 3 months

## 2025-01-24 NOTE — TELEPHONE ENCOUNTER
Future Appointments   Date Time Provider Department Center   1/28/2025 10:40 AM [facility] [place]   2/24/2025  1:30 PM Jemma Holt MD [facility] [place]   1/23/2026 10:00 AM Jemma Holt MD [facility] [place]

## 2025-01-27 RX ORDER — LINAGLIPTIN 5 MG/1
5 TABLET, FILM COATED ORAL DAILY
Qty: 30 TABLET | Refills: 11 | Status: SHIPPED | OUTPATIENT
Start: 2025-01-27 | End: 2026-01-27

## 2025-01-27 NOTE — TELEPHONE ENCOUNTER
Dr. Holt- See [employer] message. Patient is updating PCP with metformin concerns. Please advise. TY

## 2025-01-28 ENCOUNTER — HOSPITAL ENCOUNTER (OUTPATIENT)
Dept: MAMMOGRAPHY | Age: 45
Discharge: HOME | End: 2025-01-28
Attending: INTERNAL MEDICINE
Payer: MEDICAID

## 2025-01-28 ENCOUNTER — LAB ENCOUNTER (OUTPATIENT)
Dept: LAB | Age: 45
End: 2025-01-28
Attending: INTERNAL MEDICINE
Payer: MEDICAID

## 2025-01-28 DIAGNOSIS — Z12.31 ENCOUNTER FOR SCREENING MAMMOGRAM FOR BREAST CANCER: ICD-10-CM

## 2025-01-28 DIAGNOSIS — R74.8 ELEVATED ALKALINE PHOSPHATASE LEVEL: ICD-10-CM

## 2025-01-28 LAB — VIT D+METAB SERPL-MCNC: 10.2 NG/ML (ref 30–100)

## 2025-01-28 PROCEDURE — 36415 COLL VENOUS BLD VENIPUNCTURE: CPT

## 2025-01-28 PROCEDURE — 77067 SCR MAMMO BI INCL CAD: CPT | Performed by: INTERNAL MEDICINE

## 2025-01-28 PROCEDURE — 82306 VITAMIN D 25 HYDROXY: CPT

## 2025-01-28 PROCEDURE — 77063 BREAST TOMOSYNTHESIS BI: CPT | Performed by: INTERNAL MEDICINE

## 2025-01-29 RX ORDER — ERGOCALCIFEROL 1.25 MG/1
50000 CAPSULE, LIQUID FILLED ORAL WEEKLY
Qty: 12 CAPSULE | Refills: 0 | Status: SHIPPED | OUTPATIENT
Start: 2025-01-29 | End: 2025-04-29

## 2025-02-24 ENCOUNTER — OFFICE VISIT (OUTPATIENT)
Dept: INTERNAL MEDICINE CLINIC | Facility: CLINIC | Age: 45
End: 2025-02-24
Payer: MEDICAID

## 2025-02-24 VITALS
TEMPERATURE: 98 F | BODY MASS INDEX: 30.52 KG/M2 | DIASTOLIC BLOOD PRESSURE: 78 MMHG | HEIGHT: 63.5 IN | WEIGHT: 174.38 LBS | OXYGEN SATURATION: 98 % | SYSTOLIC BLOOD PRESSURE: 120 MMHG | RESPIRATION RATE: 16 BRPM | HEART RATE: 87 BPM

## 2025-02-24 DIAGNOSIS — E78.2 MIXED HYPERLIPIDEMIA: ICD-10-CM

## 2025-02-24 DIAGNOSIS — E55.9 HYPOVITAMINOSIS D: ICD-10-CM

## 2025-02-24 DIAGNOSIS — E11.9 TYPE 2 DIABETES MELLITUS WITHOUT COMPLICATION, WITHOUT LONG-TERM CURRENT USE OF INSULIN (HCC): Primary | ICD-10-CM

## 2025-02-24 PROCEDURE — 99214 OFFICE O/P EST MOD 30 MIN: CPT | Performed by: INTERNAL MEDICINE

## 2025-02-24 RX ORDER — DULAGLUTIDE 0.75 MG/.5ML
0.75 INJECTION, SOLUTION SUBCUTANEOUS WEEKLY
Qty: 2 ML | Refills: 0 | Status: SHIPPED | OUTPATIENT
Start: 2025-02-24

## 2025-02-24 NOTE — PROGRESS NOTES
[facility] Internal Medicine Office Note  Chief Complaint:   Chief Complaint   Patient presents with    Diabetes       HPI:   This is a 44 year old female coming in for DM  HPI    New diagnosis of DM from last appt   Metformin caused side effects  Doing ok with Tradjenta     High chol with     She has had a few episodes of blurred vision in the right eye. No symptoms currently     Vit D was low and prescription 50,000 units weekly was prescribed     Patient Active Problem List   Diagnosis    Tobacco use    Right acute serous otitis media    Conductive hearing loss in right ear    Type 2 diabetes mellitus without complication, without long-term current use of insulin (HCC)     Past Surgical History:   Procedure Laterality Date    Hysterectomy  2009     Family History   Problem Relation Age of Onset    Depression Mother     Hypertension Mother     Obesity Mother     Psychiatric Mother     Diabetes Father     Heart Disorder Father     Hypertension Brother     Breast Cancer Paternal Aunt 50 - 59    Breast Cancer Paternal Aunt 60 - 69        I reviewed her's Past Medical History, Past Surgical History, Family History and   Social History updated shows  Social History     Socioeconomic History    Marital status: Engaged   Tobacco Use    Smoking status: Former     Types: Cigarettes     Start date: 1/1/2023    Smokeless tobacco: Never   Vaping Use    Vaping status: Every Day    Substances: Nicotine, Flavoring    Devices: Disposable   Substance and Sexual Activity    Alcohol use: Yes     Comment: rare    Drug use: No     Social Drivers of Health     Food Insecurity: No Food Insecurity (1/22/2025)    NCSS - Food Insecurity     Worried About Running Out of Food in the Last Year: No     Ran Out of Food in the Last Year: No   Transportation Needs: No Transportation Needs (1/22/2025)    NCSS - Transportation     Lack of Transportation: No   Housing Stability: Not At Risk (1/22/2025)    NCSS - Housing/Utilities      Has Housing: Yes     Worried About Losing Housing: No     Unable to Get Utilities: No     Allergies:  Allergies[1]  Current Outpatient Medications   Medication Sig Dispense Refill    Dulaglutide (TRULICITY) 0.75 MG/0.5ML Subcutaneous Solution Auto-injector Inject 0.75 mg into the skin once a week. 2 mL 0    ergocalciferol 1.25 MG (54200 UT) Oral Cap Take 1 capsule (50,000 Units total) by mouth once a week. 12 capsule 0    linaGLIPtin (TRADJENTA) 5 mg Oral Tab Take 1 tablet (5 mg total) by mouth daily. 30 tablet 11    ibuprofen 200 MG Oral Tab Take 1 tablet (200 mg total) by mouth every 6 (six) hours as needed for Pain.           REVIEW OF SYSTEMS:   Review of Systems   Constitutional:  Negative for fever.   HENT:  Negative for congestion.    Eyes:  Negative for visual disturbance.   Respiratory:  Negative for shortness of breath.    Cardiovascular:  Negative for chest pain.   Gastrointestinal:  Negative for constipation.   Genitourinary:  Negative for dysuria.   Neurological: Negative.    Hematological: Negative.    Psychiatric/Behavioral: Negative.          EXAM:   /78   Pulse 87   Temp 97.9 °F (36.6 °C) (Temporal)   Resp 16   Ht 5' 3.5\" (1.613 m)   Wt 174 lb 6.4 oz (79.1 kg)   SpO2 98%   BMI 30.41 kg/m²  Estimated body mass index is 30.41 kg/m² as calculated from the following:    Height as of this encounter: 5' 3.5\" (1.613 m).    Weight as of this encounter: 174 lb 6.4 oz (79.1 kg).   Vital signs reviewed. Appears stated age, well groomed.  Physical Exam  Vitals reviewed.   Constitutional:       General: She is not in acute distress.     Appearance: She is well-developed.   HENT:      Head: Normocephalic and atraumatic.   Eyes:      Conjunctiva/sclera: Conjunctivae normal.   Cardiovascular:      Rate and Rhythm: Normal rate and regular rhythm.      Heart sounds: Normal heart sounds.   Pulmonary:      Effort: Pulmonary effort is normal.      Breath sounds: Normal breath sounds.   Musculoskeletal:       Cervical back: Neck supple.   Skin:     General: Skin is warm and dry.   Neurological:      General: No focal deficit present.      Mental Status: She is alert.   Psychiatric:         Mood and Affect: Mood normal.        Bilateral barefoot skin diabetic exam is normal, visualized feet and the appearance is normal.  Bilateral monofilament/sensation of both feet is normal.  Pulsation pedal pulse exam of both lower legs/feet is normal as well.     ASSESSMENT AND PLAN:   Gaby Childs is a 44 year old female with  1. Type 2 diabetes mellitus without complication, without long-term current use of insulin (HCC)    2. Hypovitaminosis D    3. Mixed hyperlipidemia          The plan is as follows  Gaby was seen today for diabetes.    Diagnoses and all orders for this visit:    Type 2 diabetes mellitus without complication, without long-term current use of insulin ([facility]) -new diagnosis at last appointment.  She had side effects with metformin and this was discontinued she is doing okay with Tradjenta.  We discussed addition of GLP-1 and she thinks that Trulicity is covered by her insurance.  We discussed potential side effects and demonstrated how to inject.  We discussed potential rare cases of stomach pains continuing despite cessation of medication. She has no FH of pancreatitis or thyroid ca.   -Referred to diabetic educator. Bring diabetic testing supplies to appointment  -Discussed yearly eye exam, urine microalbumin  -Once Trulicity 0.75 mg once a week injection has been obtained, call to schedule nurse visit appointment for injection teaching and bring medication with to appointment.  -     Ophthalmology Referral - External  -     Diabetic Test Strips and Supplies  -     Dulaglutide (TRULICITY) 0.75 MG/0.5ML Subcutaneous Solution Auto-injector; Inject 0.75 mg into the skin once a week.  -     EDUCATION -OP REFERRAL  DIABETES- FULL ED 10 HRS GRP/IND    Hypovitaminosis D - started prescription     Mixed  hyperlipidemia -we discussed risk of cardiovascular events with high cholesterol.  We discussed for patients with diabetes that goal LDL is 70 or below to decrease risk.  She does not want to start medication today since she has 2 other new medications.  She wants to make lifestyle changes and recheck cholesterol at next appointment.  We discussed that likely she will need atorvastatin but will reassess next time      Meds & Refills for this Visit:  Requested Prescriptions     Signed Prescriptions Disp Refills    Dulaglutide (TRULICITY) 0.75 MG/0.5ML Subcutaneous Solution Auto-injector 2 mL 0     Sig: Inject 0.75 mg into the skin once a week.       Imaging & Consults:  OPHTHALMOLOGY - EXTERNAL  OP REFERRAL DIABETES FULL ED 10 HRS GROUP/IND  DME DIABETIC TEST STRIPS AND SUPPLIES    Health Maintenance Due   Topic Date Due    Diabetes Care Dilated Eye Exam  Never done    Colorectal Cancer Screening  Never done    Pneumococcal Vaccine: Birth to 50yrs (1 of 2 - PCV) Never done    COVID-19 Vaccine (1 - 2024-25 season) Never done    Influenza Vaccine (1) Never done    Diabetes Care: Foot Exam (Annual)  Never done    Tobacco Cessation Counseling  Never done    Diabetes Care: Microalb/Creat Ratio (Annual)  Never done     Patient/Caregiver Education: Patient/Caregiver Education: There are no barriers to learning. Medical education done. Outcome: Patient verbalizes understanding. Patient is notified to call with any questions, complications, allergies, or worsening or changing symptoms.  Patient is to call with any side effects or complications from the treatments as a result of today.     Jemma Holt MD         [1]   Allergies  Allergen Reactions    Aspirin SWELLING    Sulfa Antibiotics

## 2025-02-24 NOTE — PATIENT INSTRUCTIONS
Call to schedule appointment with ophthalmology for evaluation of the right eye blurriness     Fill prescription for Trulicity and for diabetic testing supplies     Call us when you have obtained the Trulicity and schedule a nurse visit for injection teaching   [email or web address] for video of how to inject     Call to schedule appointment with diabetic educator     Decrease bread, rice, pasta, potatoes, juice, soda, alcohol, chips, crackers, candy, desserts, bagel, tortillas, pancakes, breakfast cereal     We'll consider atorvastatin in future for treatment of cholesterol

## 2025-02-28 ENCOUNTER — PATIENT MESSAGE (OUTPATIENT)
Dept: INTERNAL MEDICINE CLINIC | Facility: CLINIC | Age: 45
End: 2025-02-28

## 2025-03-01 NOTE — TELEPHONE ENCOUNTER
Please check in about authorization for Trulicity    Also patient is to let us know where to send the Tradjenta

## 2025-03-05 ENCOUNTER — NURSE ONLY (OUTPATIENT)
Dept: ENDOCRINOLOGY CLINIC | Facility: CLINIC | Age: 45
End: 2025-03-05
Payer: MEDICAID

## 2025-03-05 VITALS — BODY MASS INDEX: 36 KG/M2 | WEIGHT: 207 LBS

## 2025-03-05 DIAGNOSIS — E11.9 TYPE 2 DIABETES MELLITUS WITHOUT COMPLICATION, WITHOUT LONG-TERM CURRENT USE OF INSULIN (HCC): Primary | ICD-10-CM

## 2025-03-05 NOTE — PROGRESS NOTES
Gaby Childs  : 3/24/1980 attended Step 1 Diabetic Education:    Date: 3/5/2025  Referring Provider: Dr. YANIQUE Holt  Start time: 11am End time: 12:30pm    Wt 207 lb   BMI 36.09 kg/m²     HgbA1C (%)   Date Value   2025 9.9 (H)     Comment: Pt. wanted to discuss the diet she now needs to follow.  Healthy Eating:  Obtained usual diet history:Pt. has had poor eating habits most of her life. It has been difficult to try to change everything at once.She snacks when she gets to work ;celery & pb. Has never been one to eat breakfast; only dinner  Instructed on balanced plate method including what is carbohydrate, limit starch to 1/4 of plate, protein 1/4 of plate, non-starchy vegetables 1/2 of plate and modest portions of fruit, yogurt, milk if desired.    Being Active: Encouraged Physical Activity  Pt. admits she is lazy;she will add exercise to her routine but right now is overwhelmed    Monitoring: Instructed/reinforced blood glucose monitoring, testing schedules and target goals:   Fasting / Pre-meal  2 Hour Post-prandial 140-180 mg/dl  SMB25 to 3/5/25      Fastin mg/dl     2 hr postdinner: 213 (ate chips, queso sandwich,11 Doritos & lit lemonade/ice tea drink)160,214  Overnight: 12:09am 128 mg/dl( Ate 1 slice of salami & sausage 4 hrs before bed)156,217,136,257 mg/dl    Emotional: Pt. says that her family is constantly telling her how she needs to eat;her daughter has always eaten healthy & is now studying to become an RD. Feeling pressured from all sides.      Recommendations:    Continue monitoring blood glucose at varied times of the day  Limit carbs to 30-45 grams of carb per meal  Continue taking notes about what foods or activities she has done on the trinh  Try to fill 1/2 her plate with \"Free veggies\"  Follow-up for additional education regarding diabetes diagnosis    Written materials provided for all areas covered.    Patient verbalized understanding and has no further  questions at this time    Lorraine Fair RN, [facility]

## 2025-03-06 ENCOUNTER — TELEPHONE (OUTPATIENT)
Dept: ENDOCRINOLOGY CLINIC | Facility: CLINIC | Age: 45
End: 2025-03-06

## 2025-03-06 DIAGNOSIS — E11.9 TYPE 2 DIABETES MELLITUS WITHOUT COMPLICATION, WITHOUT LONG-TERM CURRENT USE OF INSULIN (HCC): Primary | ICD-10-CM

## 2025-03-06 RX ORDER — BLOOD SUGAR DIAGNOSTIC
STRIP MISCELLANEOUS
Qty: 100 STRIP | Refills: 3 | Status: SHIPPED | OUTPATIENT
Start: 2025-03-06

## 2025-03-06 RX ORDER — LANCETS 30 GAUGE
1 EACH MISCELLANEOUS 2 TIMES DAILY
Qty: 200 EACH | Refills: 3 | Status: SHIPPED | OUTPATIENT
Start: 2025-03-06

## 2025-03-06 RX ORDER — BLOOD-GLUCOSE METER
1 KIT MISCELLANEOUS AS DIRECTED
Qty: 1 KIT | Refills: 0 | Status: SHIPPED | OUTPATIENT
Start: 2025-03-06

## 2025-03-06 NOTE — TELEPHONE ENCOUNTER
Incoming fax from [facility]   PA Denied due to \"must not take drug together with a DPP-4\"   Please advise

## 2025-03-07 NOTE — TELEPHONE ENCOUNTER
Approved    Prior authorization approved  Payer: [employer] [facility] Case ID: [id number]    [phone]  Note from payer: The case has been Approved from  [id number]  Approval Details    Authorized from January 1, 2025 to March 7, 2026    Pharmacy notified

## 2025-04-14 ENCOUNTER — OFFICE VISIT (OUTPATIENT)
Dept: INTERNAL MEDICINE CLINIC | Facility: CLINIC | Age: 45
End: 2025-04-14
Payer: MEDICAID

## 2025-04-14 VITALS
SYSTOLIC BLOOD PRESSURE: 114 MMHG | HEART RATE: 94 BPM | TEMPERATURE: 98 F | OXYGEN SATURATION: 100 % | WEIGHT: 171.19 LBS | RESPIRATION RATE: 16 BRPM | BODY MASS INDEX: 29.96 KG/M2 | HEIGHT: 63.5 IN | DIASTOLIC BLOOD PRESSURE: 86 MMHG

## 2025-04-14 DIAGNOSIS — E11.65 UNCONTROLLED TYPE 2 DIABETES MELLITUS WITH HYPERGLYCEMIA (HCC): Primary | ICD-10-CM

## 2025-04-14 PROCEDURE — 99213 OFFICE O/P EST LOW 20 MIN: CPT | Performed by: INTERNAL MEDICINE

## 2025-04-14 RX ORDER — DULAGLUTIDE 1.5 MG/.5ML
2 INJECTION, SOLUTION SUBCUTANEOUS WEEKLY
Qty: 6 ML | Refills: 1 | Status: SHIPPED | OUTPATIENT
Start: 2025-04-14

## 2025-04-14 RX ORDER — BLOOD SUGAR DIAGNOSTIC
STRIP MISCELLANEOUS
Qty: 200 STRIP | Refills: 3 | Status: SHIPPED | OUTPATIENT
Start: 2025-04-14

## 2025-04-14 RX ORDER — LANCETS 30 GAUGE
1 EACH MISCELLANEOUS 2 TIMES DAILY
Qty: 200 EACH | Refills: 3 | Status: SHIPPED | OUTPATIENT
Start: 2025-04-14

## 2025-04-14 NOTE — PROGRESS NOTES
[facility] Internal Medicine Office Note  Chief Complaint:   Chief Complaint   Patient presents with    Medication Follow-Up       HPI:   This is a 45 year old female coming in for DM  HPI  New diagnosis of DM from 1/2025. Glucose has been in 70s-80s at home. She has had occasional nausea but otherwise doing well with Trulicity 0.75mg  She has made dietary changes  She had one meeting with diabetic educator and will be seeing dietician next month   She has been checking glu several times a day and would like to continue this frequency        Problem List[1]  Past Surgical History[2]  Family History[3]     I reviewed her's Past Medical History, Past Surgical History, Family History and   Social History updated shows  Short Social Hx on File[4]  Allergies:  Allergies[5]  Current Medications[6]      REVIEW OF SYSTEMS:   Review of Systems   Constitutional:  Negative for fever.   HENT:  Negative for congestion.    Eyes:  Negative for visual disturbance.   Respiratory:  Negative for shortness of breath.    Cardiovascular:  Negative for chest pain.   Gastrointestinal:  Negative for constipation.   Genitourinary:  Negative for dysuria.   Neurological: Negative.    Hematological: Negative.    Psychiatric/Behavioral: Negative.          EXAM:   /86   Pulse 94   Temp 97.9 °F (36.6 °C) (Temporal)   Resp 16   Ht 5' 3.5\" (1.613 m)   Wt 171 lb 3.2 oz (77.7 kg)   SpO2 100%   BMI 29.85 kg/m²  Estimated body mass index is 29.85 kg/m² as calculated from the following:    Height as of this encounter: 5' 3.5\" (1.613 m).    Weight as of this encounter: 171 lb 3.2 oz (77.7 kg).   Vital signs reviewed. Appears stated age, well groomed.  Physical Exam  Vitals reviewed.   Constitutional:       General: She is not in acute distress.     Appearance: She is well-developed.   HENT:      Head: Normocephalic and atraumatic.   Eyes:      Conjunctiva/sclera: Conjunctivae normal.   Cardiovascular:      Rate and Rhythm: Normal  rate and regular rhythm.      Heart sounds: Normal heart sounds.   Pulmonary:      Effort: Pulmonary effort is normal.      Breath sounds: Normal breath sounds.   Musculoskeletal:      Cervical back: Neck supple.   Skin:     General: Skin is warm and dry.   Neurological:      General: No focal deficit present.      Mental Status: She is alert.   Psychiatric:         Mood and Affect: Mood normal.          ASSESSMENT AND PLAN:   Gaby Childs is a 45 year old female with  1. Uncontrolled type 2 diabetes mellitus with hyperglycemia (HCC)          The plan is as follows  Gaby was seen today for medication follow-up.    Diagnoses and all orders for this visit:    Uncontrolled type 2 diabetes mellitus with hyperglycemia (HCC) - increase dose of trulicity to 1.5mg once a week. A1c due 25 or later and due for urine microalbumin.   Discussed diabetic diet  She has f/u scheduled with diabetes educator  -f/u 3 months  -due for dilated eye exam   -     Hemoglobin A1C; Future  -     Microalb/Creat Ratio, Random Urine; Future  -     Dulaglutide (TRULICITY) 1.5 MG/0.5ML Subcutaneous Solution Auto-injector; Inject 2 mL into the skin once a week.  -     Glucose Blood (ONETOUCH VERIO) In Vitro Strip; Check blood sugar twice daily  -     OneTouch Delica Lancets 30G Does not apply Misc; 1 each 2 (two) times daily.      Orders Placed This Encounter   Procedures    Hemoglobin A1C    Microalb/Creat Ratio, Random Urine       Meds & Refills for this Visit:  Requested Prescriptions     Signed Prescriptions Disp Refills    Dulaglutide (TRULICITY) 1.5 MG/0.5ML Subcutaneous Solution Auto-injector 6 mL 1     Sig: Inject 2 mL into the skin once a week.    Glucose Blood (ONETOUCH VERIO) In Vitro Strip 200 strip 3     Sig: Check blood sugar twice daily    OneTouch Delica Lancets 30G Does not apply Misc 200 each 3     Si each 2 (two) times daily.       Imaging & Consults:  None    Health Maintenance Due   Topic Date Due    Diabetes  Care Dilated Eye Exam  Never done    Colorectal Cancer Screening  Never done    Pneumococcal Vaccine: Birth to 50yrs (1 of 2 - PCV) Never done    COVID-19 Vaccine (1 - 2024-25 season) Never done    Tobacco Cessation Counseling  Never done    Diabetes Care: Microalb/Creat Ratio (Annual)  Never done    Diabetes Care A1C  04/22/2025     Patient/Caregiver Education: Patient/Caregiver Education: There are no barriers to learning. Medical education done. Outcome: Patient verbalizes understanding. Patient is notified to call with any questions, complications, allergies, or worsening or changing symptoms.  Patient is to call with any side effects or complications from the treatments as a result of today.     Jemma Holt MD       [1]   Patient Active Problem List  Diagnosis    Tobacco use    Right acute serous otitis media    Conductive hearing loss in right ear    Type 2 diabetes mellitus without complication, without long-term current use of insulin (HCC)   [2]   Past Surgical History:  Procedure Laterality Date    Hysterectomy  2009   [3]   Family History  Problem Relation Age of Onset    Depression Mother     Hypertension Mother     Obesity Mother     Psychiatric Mother     Diabetes Father     Heart Disorder Father     Hypertension Brother     Breast Cancer Paternal Aunt 50 - 59    Breast Cancer Paternal Aunt 60 - 69   [4]   Social History  Socioeconomic History    Marital status: Engaged   Tobacco Use    Smoking status: Former     Types: Cigarettes     Start date: 1/1/2023    Smokeless tobacco: Never   Vaping Use    Vaping status: Every Day    Substances: Nicotine, Flavoring    Devices: Disposable   Substance and Sexual Activity    Alcohol use: Yes     Comment: rare    Drug use: No     Social Drivers of Health     Food Insecurity: No Food Insecurity (1/22/2025)    NCSS - Food Insecurity     Worried About Running Out of Food in the Last Year: No     Ran Out of Food in the Last Year: No   Transportation Needs: No  Transportation Needs (1/22/2025)    NCSS - Transportation     Lack of Transportation: No   Housing Stability: Not At Risk (1/22/2025)    NCSS - Housing/Utilities     Has Housing: Yes     Worried About Losing Housing: No     Unable to Get Utilities: No   [5]   Allergies  Allergen Reactions    Aspirin SWELLING    Sulfa Antibiotics    [6]   Current Outpatient Medications   Medication Sig Dispense Refill    FIBER ADULT GUMMIES OR Take by mouth.      Dulaglutide (TRULICITY) 1.5 MG/0.5ML Subcutaneous Solution Auto-injector Inject 2 mL into the skin once a week. 6 mL 1    Glucose Blood (ONETOUCH VERIO) In Vitro Strip Check blood sugar twice daily 200 strip 3    OneTouch Delica Lancets 30G Does not apply Misc 1 each 2 (two) times daily. 200 each 3    Blood Glucose Monitoring Suppl (ONETOUCH VERIO REFLECT) w/Device Does not apply Kit 1 each As Directed. 1 kit 0    ergocalciferol 1.25 MG (38286 UT) Oral Cap Take 1 capsule (50,000 Units total) by mouth once a week. 12 capsule 0

## 2025-04-15 ENCOUNTER — TELEPHONE (OUTPATIENT)
Dept: INTERNAL MEDICINE CLINIC | Facility: CLINIC | Age: 45
End: 2025-04-15

## 2025-04-15 DIAGNOSIS — E11.65 UNCONTROLLED TYPE 2 DIABETES MELLITUS WITH HYPERGLYCEMIA (HCC): ICD-10-CM

## 2025-04-15 NOTE — TELEPHONE ENCOUNTER
Incoming fax from [facility] needing clarification on the dosing of the Trulicity  Please advise

## 2025-04-16 RX ORDER — DULAGLUTIDE 1.5 MG/.5ML
0.5 INJECTION, SOLUTION SUBCUTANEOUS WEEKLY
Qty: 6 ML | Refills: 1 | Status: SHIPPED | OUTPATIENT
Start: 2025-04-16

## 2025-04-16 NOTE — TELEPHONE ENCOUNTER
Grant Johnson  # [phone]    Grant is wanting updates on fax sent. She was informed that LS was not in yday but back today.    Grant is wanting to clarify if LS wants pt to use entire supply for trulicity?

## 2025-04-24 ENCOUNTER — LAB ENCOUNTER (OUTPATIENT)
Dept: LAB | Age: 45
End: 2025-04-24
Attending: INTERNAL MEDICINE
Payer: MEDICAID

## 2025-04-24 DIAGNOSIS — E11.65 UNCONTROLLED TYPE 2 DIABETES MELLITUS WITH HYPERGLYCEMIA (HCC): ICD-10-CM

## 2025-04-24 LAB
CREAT UR-SCNC: 116.9 MG/DL
EST. AVERAGE GLUCOSE BLD GHB EST-MCNC: 143 MG/DL (ref 68–126)
HBA1C MFR BLD: 6.6 % (ref ?–5.7)
MICROALBUMIN UR-MCNC: 0.3 MG/DL
MICROALBUMIN/CREAT 24H UR-RTO: 2.6 UG/MG (ref ?–30)

## 2025-04-24 PROCEDURE — 82043 UR ALBUMIN QUANTITATIVE: CPT

## 2025-04-24 PROCEDURE — 82570 ASSAY OF URINE CREATININE: CPT

## 2025-04-24 PROCEDURE — 36415 COLL VENOUS BLD VENIPUNCTURE: CPT

## 2025-04-24 PROCEDURE — 83036 HEMOGLOBIN GLYCOSYLATED A1C: CPT

## 2025-05-06 ENCOUNTER — NURSE ONLY (OUTPATIENT)
Facility: CLINIC | Age: 45
End: 2025-05-06
Payer: MEDICAID

## 2025-05-06 DIAGNOSIS — E11.9 TYPE 2 DIABETES MELLITUS WITHOUT COMPLICATION, WITHOUT LONG-TERM CURRENT USE OF INSULIN (HCC): Primary | ICD-10-CM

## 2025-05-06 PROCEDURE — G0109 DIAB MANAGE TRN IND/GROUP: HCPCS | Performed by: DIETITIAN, REGISTERED

## 2025-05-20 ENCOUNTER — NURSE ONLY (OUTPATIENT)
Facility: CLINIC | Age: 45
End: 2025-05-20
Payer: MEDICAID

## 2025-05-20 DIAGNOSIS — E11.9 TYPE 2 DIABETES MELLITUS WITHOUT COMPLICATION, WITHOUT LONG-TERM CURRENT USE OF INSULIN (HCC): Primary | ICD-10-CM

## 2025-05-20 PROCEDURE — G0109 DIAB MANAGE TRN IND/GROUP: HCPCS | Performed by: DIETITIAN, REGISTERED

## 2025-06-02 ENCOUNTER — OFFICE VISIT (OUTPATIENT)
Dept: FAMILY MEDICINE CLINIC | Facility: CLINIC | Age: 45
End: 2025-06-02
Payer: MEDICAID

## 2025-06-02 VITALS
WEIGHT: 166 LBS | BODY MASS INDEX: 29 KG/M2 | TEMPERATURE: 98 F | HEART RATE: 71 BPM | DIASTOLIC BLOOD PRESSURE: 84 MMHG | OXYGEN SATURATION: 99 % | RESPIRATION RATE: 16 BRPM | SYSTOLIC BLOOD PRESSURE: 130 MMHG

## 2025-06-02 DIAGNOSIS — B96.89 ACUTE BACTERIAL SINUSITIS: Primary | ICD-10-CM

## 2025-06-02 DIAGNOSIS — J01.90 ACUTE BACTERIAL SINUSITIS: Primary | ICD-10-CM

## 2025-06-02 PROCEDURE — 99213 OFFICE O/P EST LOW 20 MIN: CPT | Performed by: NURSE PRACTITIONER

## 2025-06-02 RX ORDER — FLUTICASONE PROPIONATE 50 MCG
2 SPRAY, SUSPENSION (ML) NASAL DAILY
Qty: 1 EACH | Refills: 0 | Status: SHIPPED | OUTPATIENT
Start: 2025-06-02

## 2025-06-02 NOTE — PROGRESS NOTES
Gaby Childs is a 45 year old female.   Chief Complaint   Patient presents with    Sinus Problem     Sinus pressure, nasal congestion, dizzy and ear discomfort s/s for 7 days. OTC meds taken.      HPI:    Symptoms started approximately  7 days ago with purulent nasal discharge, maxillary sinus pressure, and headache, a lot of  post-nasal drip. Symptoms have been persisting since onset. Patient endorses cough that is keeping her awake at night. Patient denies HA,fever, chills, or malaise. Patient denies sore throat. Patient denies N/V/D. No OTC medication for treatment.    Allergies:  Allergies[1]       Current Medications[2]   Past Medical History[3]   Past Surgical History[4]         ROS:   GENERAL HEALTH: otherwise feels well  EYES: no visual complaints or deficits  RESPIRATORY: no shortness of breath, or wheezing, endorses cough; tends to be worse at night and causes some loss of sleep.  CARDIOVASCULAR: no chest pain or SOB.  GI: denies nausea, vomiting.  Neuro: denies HA or lighheadedness  PE:  /84   Pulse 71   Temp 98.3 °F (36.8 °C) (Oral)   Resp 16   Wt 166 lb (75.3 kg)   SpO2 99%   BMI 28.94 kg/m²   General: WD/WN in no acute distress.   Eyes & ears: conjunctiva clear, sclera white; TM’s non-bulging without erythema.   Sinuses maxillary: tender to percussion.   Nares: red mucosa and thick yellow discharge, no septal deviations.   Mouth: moist with mild erythema, no exudates, and + PND.   Neck: Supple with no cervical LAD.   Lungs: Clear to auscultation bilaterally; no wheeze, rhonchi, or rales.    Heart: S1/S2 regular no murmurs.      ASSESSMENT/ PLAN:     Encounter Diagnosis   Name Primary?    Acute bacterial sinusitis Yes       Meds & Refills for this Visit:  Requested Prescriptions     Signed Prescriptions Disp Refills    amoxicillin clavulanate 875-125 MG Oral Tab 20 tablet 0     Sig: Take 1 tablet by mouth 2 (two) times daily for 10 days.    fluticasone propionate 50 MCG/ACT Nasal  Suspension 1 each 0     Si sprays by Each Nare route daily.       Imaging & Consults:  None    Increase fluids, rest, Tylenol or Ibuprofen prn, f/u in 5 days if not better.               [1]   Allergies  Allergen Reactions    Aspirin SWELLING    Sulfa Antibiotics    [2]   Current Outpatient Medications   Medication Sig Dispense Refill    amoxicillin clavulanate 875-125 MG Oral Tab Take 1 tablet by mouth 2 (two) times daily for 10 days. 20 tablet 0    fluticasone propionate 50 MCG/ACT Nasal Suspension 2 sprays by Each Nare route daily. 1 each 0    Dulaglutide (TRULICITY) 1.5 MG/0.5ML Subcutaneous Solution Auto-injector Inject 0.5 mL into the skin once a week. 6 mL 1    FIBER ADULT GUMMIES OR Take by mouth.      Glucose Blood (ONETOUCH VERIO) In Vitro Strip Check blood sugar twice daily 200 strip 3    OneTouch Delica Lancets 30G Does not apply Misc 1 each 2 (two) times daily. 200 each 3    Blood Glucose Monitoring Suppl (ONETOUCH VERIO REFLECT) w/Device Does not apply Kit 1 each As Directed. 1 kit 0   [3] No past medical history on file.  [4]   Past Surgical History:  Procedure Laterality Date    Hysterectomy

## 2025-06-03 ENCOUNTER — NURSE ONLY (OUTPATIENT)
Facility: CLINIC | Age: 45
End: 2025-06-03
Payer: MEDICAID

## 2025-06-03 DIAGNOSIS — E11.9 TYPE 2 DIABETES MELLITUS WITHOUT COMPLICATION, WITHOUT LONG-TERM CURRENT USE OF INSULIN (HCC): Primary | ICD-10-CM

## 2025-06-03 PROCEDURE — G0109 DIAB MANAGE TRN IND/GROUP: HCPCS | Performed by: DIETITIAN, REGISTERED

## 2025-06-03 NOTE — PROGRESS NOTES
Gabyjeff Childs 3/24/1980 attended Class 2 Group Diabetes Education Class: Intro to Diabetes, Types of Diabetes, Monitoring, Hypoglycemia, Hyperglycemia.     6/3/2025        Referring Provider: Jemma Holt MD  Start time: 5:30pm        End time: 6:30pm    Telehealth attestation: This visit is conducted using Telemedicine with live, interactive audio and video.  Patient verbally consents to Telemedicine visit.  Patient understands and accepts financial responsibility for any deductible, co-insurance and/or co-pays associated with this service.      Diabetes Overview, pathophysiology, pre-diabetes, A1C results and treatment options for diabetes self-management, types of diabetes and risk factors. Types of diabetes including Type 1, Type 2, NURYS, FAVIOLA, CFRD, Gestational, and NODAT.    Healthy Eating  Reviewed the balanced plate method.  Reviewed eating at regular intervals can help with medications.  Hypoglycemia signs. Symptoms and treatment using the Rule of 15 for treating hypoglycemia.  Apps helpful for smart phones as well as websites provided.    Being Active  Discussed exercise benefits.    Taking Medications  Discussed medication methods of action, side effects.  Reviewed that treatment plans can change, when change may occur.    Monitoring  Benefits and options for glucose monitoring, target BG goals, HgbA1C values.  How to use a glucometer.  CGM vs A1c results.    Problem Solving: Prevention, detection and treatment of acute complications  Discussed signs, symptoms and treatment of hypoglycemia and hyperglycemia, as well as sick day management.    Behavior Change  Goals set for nutrition and medications, pre and post-tests taken.    Recommendations:    Begin implementing healthy eating habits with portion control and attend Class 3.  Written materials provided for all areas covered.    Steph Friend, RD, , LD, CDCES

## 2025-06-04 NOTE — PROGRESS NOTES
Gaby Childs 3/24/1980  attended Step 4 Group Class: Reducing Complications, Stress Management, Disaster Planning, & Special Occasions   Date: 6/4/2025          Referring Provider:  Jemma Holt MD                Start time: 5:30pm      End time: 6:45pm    Telehealth Attestation: This visit is conducted using Telemedicine with live, interactive audio and video.  Patient verbally consents to Telemedicine visit.  Patient understands and accepts financial responsibility for any deductible, co-insurance and/or co-pays associated with this service.       The patient participated during the class: Patient was able to identify ways to reduce risks for micro and macro-vascular complications of diabetes.      Healthy Eating  Discuss tips for dining out, holidays and special occasion eating.  Reviewed carbohydrate counting and balancing meals.     Being Active  Reinforce the importance of regular physical activity.     Monitoring  Discuss/answer questions about blood glucose trends.     Taking Medication   Reinforce the importance of taking diabetes medications as prescribed.     Problem Solving  Discuss disaster preparedness and planning to travel safely with diabetes.  Discussed importance of medic alert identification.     Reducing Risks  Discuss potential complications of uncontrolled diabetes and how to minimize risk including eye, foot, dental, and skin care.   Renal and cardiovascular monitoring discussed including target goals and signs/symptoms of MI and CVA.   Discuss immunizations recommended for diabetes.    Healthy Coping  Discuss support plan, stress reduction, and diabetes distress.  Discussed coping strategies and how to apply.  Reviewed when to ask for extra help with coping.      The patient verbalized understanding and has no further questions at this time.  Written materials provided for all areas covered.  Recommendation: attend Step 5 Class.    Steph Friend, RD, , LD, CDCES

## 2025-06-05 NOTE — PROGRESS NOTES
Gaby Childs : 3/24/1980 attended Step 3 Group Diabetes Education Class: Food Groups, Carbohydrate Counting, Label Reading    2025   Referring Provider: Jemma Holt MD  Start time: 5:30pm End time: 7:00pm    Telehealth Attestation: This visit is conducted using Telemedicine with live, interactive audio and video.  Patient verbally consents to Telemedicine visit.  Patient understands and accepts financial responsibility for any deductible, co-insurance and/or co-pays associated with this service.      The patient participated during the class: Pt was able to identify sources of carbohydrates, read food labels for carb content of foods.       Healthy Eating:  Reviewed Basic Diet Guidelines as foundation of diabetes meal planning. Reinforced the balanced plate method. Taught nutrition basics defining carbohydrate, protein, and fat. Taught label reading, including an option to count carbohydrate grams or servings. Provided patient with goals for carbohydrate grams/choices for meals and snacks. Discussed sugar substitutes, alcohol and effect on blood glucose. Maria Elena's helpful for smart phones, as well as websites for examining carbohydrate levels provided. Reviewed and reinforced macronutrient's, carbohydrate counting and meal planning.    Problem Solving:  Reinforced signs, symptoms, and treatment of hypoglycemia using the Rule of 15.  Importance of being aware of potential for a low blood sugar when consuming alcohol  Discussed impact of different food items and portion sizes on blood glucose levels.  Discussed blood glucose target of 180 mg/dL, if testing 2 hours post meal.    Monitoring:  Reviewed blood glucose records and importance of tracking foods/blood glucose levels.    Behavior Change:  Reviewed and updated individual goals and action plan set by patient.   Addressed barriers to tracking foods/blood glucose levels and following guidelines presented/achieving goals, and setting SMART goals as a  group discussion.    Recommendations:  Follow individual meal plan recommended by Educator (DARELL).  Continue implementing individual goals.   Attend remaining class sessions.  Begin implementing carbohydrate counting and continue dietary and blood glucose tracking.     Written materials provided for all areas covered.    Patient verbalized understanding and has no further questions currently.    Steph Friend RD, , LD, [facility]

## 2025-06-17 ENCOUNTER — NURSE ONLY (OUTPATIENT)
Facility: CLINIC | Age: 45
End: 2025-06-17
Payer: MEDICAID

## 2025-06-17 DIAGNOSIS — E11.9 TYPE 2 DIABETES MELLITUS WITHOUT COMPLICATION, WITHOUT LONG-TERM CURRENT USE OF INSULIN (HCC): Primary | ICD-10-CM

## 2025-06-17 PROCEDURE — 99211 OFF/OP EST MAY X REQ PHY/QHP: CPT | Performed by: DIETITIAN, REGISTERED

## 2025-06-24 ENCOUNTER — PATIENT MESSAGE (OUTPATIENT)
Dept: INTERNAL MEDICINE CLINIC | Facility: CLINIC | Age: 45
End: 2025-06-24

## 2025-06-24 NOTE — TELEPHONE ENCOUNTER
Patient requesting next dose of Trulicity. She is currently on Trulicity 1.5 mg. Last remaining dose will be self administered on 6/30.     LOV - 4/14  Uncontrolled type 2 diabetes mellitus with hyperglycemia (HCC) - increase dose of trulicity to 1.5mg once a week.     Order pended. Please sign if appropriate.

## 2025-06-27 RX ORDER — DULAGLUTIDE 3 MG/.5ML
3 INJECTION, SOLUTION SUBCUTANEOUS WEEKLY
Qty: 2 ML | Refills: 0 | Status: SHIPPED | OUTPATIENT
Start: 2025-06-27

## 2025-06-30 NOTE — PROGRESS NOTES
Gaby Childs : 3/24/1980 attended Step 5 Group Class: Heart Healthy Eating and Exercise    Date: 2025           Referring Provider: Jemma Holt MD                 Start time: 5:30pm       End time: 6:30pm    Telehealth Attestation: This visit is conducted using Telemedicine with live, interactive audio and video.  Patient verbally consents to Telemedicine visit.  Patient understands and accepts financial responsibility for any deductible, co-insurance and/or co-pays associated with this service.       The patient participated during the class: Patient verbalized dietary changes recommended to reduce saturated fat and sodium and to increase dietary fiber.      Reviewed complications, including DKA (diabetic ketoacidosis) and HHNS (hyperosmolar, hyperglycemic non-ketotic syndrome).    Healthy Eating  Discussed heart healthy diet (types of fat, cholesterol, fiber and sodium)   Mediterranean and DASH diets including sample meal plans   Discussed reading nutrition facts labels, nutrition claims  Discussed challenges at the grocery store and when eating out and options                       Being Active  Discussed benefits and precautions of regular physical activity (aerobic exercise and strength training).     Monitoring  Discussed blood pressure goals.  Discussed lipid levels and goals.    Behavior Change  Reviewed and updated individual goals and action plan set by patient.     Problem Solving  Reviewed progress on overall goals to-date  Addressed barriers to setting and following guidelines presented/achieving goals, as a group discussion.     Recommendations:   Work toward limiting saturated fat and sodium and increasing fiber in diet.  Work toward getting 30 minutes of aerobic activity up to 5 or more days/week.  Continue implementing individual goals.  Follow up through MNT or DSMT as needed.  Complete Diabetes Continued Care/ADA Recommended Screenings (labs, eye exam, foot exam, dental exam,  vaccines).    Written materials provided for all areas covered.    Steph Friend RD, , LD, CDCES

## 2025-07-14 ENCOUNTER — OFFICE VISIT (OUTPATIENT)
Dept: INTERNAL MEDICINE CLINIC | Facility: CLINIC | Age: 45
End: 2025-07-14
Payer: MEDICAID

## 2025-07-14 VITALS
OXYGEN SATURATION: 98 % | RESPIRATION RATE: 16 BRPM | WEIGHT: 165.19 LBS | TEMPERATURE: 98 F | BODY MASS INDEX: 28.91 KG/M2 | DIASTOLIC BLOOD PRESSURE: 68 MMHG | HEIGHT: 63.5 IN | HEART RATE: 78 BPM | SYSTOLIC BLOOD PRESSURE: 120 MMHG

## 2025-07-14 DIAGNOSIS — E11.65 UNCONTROLLED TYPE 2 DIABETES MELLITUS WITH HYPERGLYCEMIA (HCC): Primary | ICD-10-CM

## 2025-07-14 PROCEDURE — 99213 OFFICE O/P EST LOW 20 MIN: CPT | Performed by: INTERNAL MEDICINE

## 2025-07-14 NOTE — PROGRESS NOTES
[facility] Internal Medicine Office Note  Chief Complaint:   Chief Complaint   Patient presents with    Follow - Up       HPI:   This is a 45 year old female coming in for DM follow up   HPI       Patient name: Gaby Childs    DM: taking Trulicity 3mg once a week. Glu readings typically range in 110-160  She completed DM class through [employer]        Problem List[1]  Past Surgical History[2]  Family History[3]     I reviewed her's Past Medical History, Past Surgical History, Family History and   Social History updated shows  Short Social Hx on File[4]  Allergies:  Allergies[5]  Current Medications[6]      REVIEW OF SYSTEMS:   Review of Systems   Constitutional:  Negative for fever.   HENT:  Negative for congestion.    Eyes:  Negative for visual disturbance.   Respiratory:  Negative for shortness of breath.    Cardiovascular:  Negative for chest pain.   Gastrointestinal:  Negative for constipation.   Genitourinary:  Negative for dysuria.   Neurological: Negative.    Hematological: Negative.    Psychiatric/Behavioral: Negative.          EXAM:   /68   Pulse 78   Temp 98 °F (36.7 °C) (Temporal)   Resp 16   Ht 5' 3.5\" (1.613 m)   Wt 165 lb 3.2 oz (74.9 kg)   SpO2 98%   BMI 28.80 kg/m²  Estimated body mass index is 28.8 kg/m² as calculated from the following:    Height as of this encounter: 5' 3.5\" (1.613 m).    Weight as of this encounter: 165 lb 3.2 oz (74.9 kg).   Vital signs reviewed. Appears stated age, well groomed.  Physical Exam  Vitals reviewed.   Constitutional:       General: She is not in acute distress.     Appearance: She is well-developed.   HENT:      Head: Normocephalic and atraumatic.      Right Ear: Tympanic membrane normal.      Left Ear: Tympanic membrane normal.   Eyes:      Conjunctiva/sclera: Conjunctivae normal.   Cardiovascular:      Rate and Rhythm: Normal rate and regular rhythm.      Heart sounds: Normal heart sounds.   Pulmonary:      Effort: Pulmonary effort is  normal.      Breath sounds: Normal breath sounds.   Musculoskeletal:      Cervical back: Neck supple.   Skin:     General: Skin is warm and dry.   Neurological:      General: No focal deficit present.      Mental Status: She is alert.   Psychiatric:         Mood and Affect: Mood normal.          ASSESSMENT AND PLAN:   Gaby Childs is a 45 year old female with  1. Uncontrolled type 2 diabetes mellitus with hyperglycemia (HCC)          The plan is as follows  Gaby was seen today for follow - up.    Diagnoses and all orders for this visit:    Uncontrolled type 2 diabetes mellitus with hyperglycemia (HCC) - A1c improved from 9.9 ->6.6, due for recheck after 7/24/25. Cont trulicity.   Due for DM eye exam  -     Hemoglobin A1C; Future  -     Diabetic Test Strips and Supplies    She requests new glucometer as hers is not covered by insurance.     Due for colonoscopy        Orders Placed This Encounter   Procedures    Hemoglobin A1C       Meds & Refills for this Visit:  Requested Prescriptions      No prescriptions requested or ordered in this encounter       Imaging & Consults:  DME DIABETIC TEST STRIPS AND SUPPLIES    Health Maintenance Due   Topic Date Due    Diabetes Care Dilated Eye Exam  Never done    Colorectal Cancer Screening  Never done    Statin Use in Persons with Diabetes  Never done    Pneumococcal Vaccine: Birth to 50yrs (1 of 2 - PCV) Never done    COVID-19 Vaccine (1 - 2024-25 season) Never done    Tobacco Cessation Counseling  Never done     Patient/Caregiver Education: Patient/Caregiver Education: There are no barriers to learning. Medical education done. Outcome: Patient verbalizes understanding. Patient is notified to call with any questions, complications, allergies, or worsening or changing symptoms.  Patient is to call with any side effects or complications from the treatments as a result of today.     Jemma Holt MD         [1]   Patient Active Problem List  Diagnosis    Tobacco use     Right acute serous otitis media    Conductive hearing loss in right ear    Type 2 diabetes mellitus without complication, without long-term current use of insulin (HCC)   [2]   Past Surgical History:  Procedure Laterality Date    Hysterectomy  2009   [3]   Family History  Problem Relation Age of Onset    Depression Mother     Hypertension Mother     Obesity Mother     Psychiatric Mother     Diabetes Father     Heart Disorder Father     Hypertension Brother     Breast Cancer Paternal Aunt 50 - 59    Breast Cancer Paternal Aunt 60 - 69   [4]   Social History  Socioeconomic History    Marital status: Engaged   Tobacco Use    Smoking status: Former     Types: Cigarettes     Start date: 1/1/2023    Smokeless tobacco: Never   Vaping Use    Vaping status: Every Day    Substances: Nicotine, Flavoring    Devices: Disposable   Substance and Sexual Activity    Alcohol use: Yes     Comment: rare    Drug use: No     Social Drivers of Health     Food Insecurity: No Food Insecurity (1/22/2025)    NCSS - Food Insecurity     Worried About Running Out of Food in the Last Year: No     Ran Out of Food in the Last Year: No   Transportation Needs: No Transportation Needs (1/22/2025)    NCSS - Transportation     Lack of Transportation: No   Housing Stability: Not At Risk (1/22/2025)    NCSS - Housing/Utilities     Has Housing: Yes     Worried About Losing Housing: No     Unable to Get Utilities: No   [5]   Allergies  Allergen Reactions    Aspirin SWELLING    Sulfa Antibiotics    [6]   Current Outpatient Medications   Medication Sig Dispense Refill    Dulaglutide (TRULICITY) 3 MG/0.5ML Subcutaneous Solution Auto-injector Inject 3 mg into the skin once a week. 2 mL 0    FIBER ADULT GUMMIES OR Take by mouth.      Glucose Blood (ONETOUCH VERIO) In Vitro Strip Check blood sugar twice daily 200 strip 3    OneTouch Delica Lancets 30G Does not apply Misc 1 each 2 (two) times daily. 200 each 3    Blood Glucose Monitoring Suppl (ONETOUCH VERIO  REFLECT) w/Device Does not apply Kit 1 each As Directed. 1 kit 0

## 2025-07-14 NOTE — PATIENT INSTRUCTIONS
Blood work after 7/24/25    Consider colonoscopy with gastroenterology [phone]    Call to schedule appointment with ophthalmology [phone]

## 2025-07-28 ENCOUNTER — LAB ENCOUNTER (OUTPATIENT)
Dept: LAB | Age: 45
End: 2025-07-28
Attending: INTERNAL MEDICINE
Payer: MEDICAID

## 2025-07-28 DIAGNOSIS — E11.65 UNCONTROLLED TYPE 2 DIABETES MELLITUS WITH HYPERGLYCEMIA (HCC): ICD-10-CM

## 2025-07-28 LAB
EST. AVERAGE GLUCOSE BLD GHB EST-MCNC: 123 MG/DL (ref 68–126)
HBA1C MFR BLD: 5.9 % (ref ?–5.7)

## 2025-07-28 PROCEDURE — 36415 COLL VENOUS BLD VENIPUNCTURE: CPT

## 2025-07-28 PROCEDURE — 83036 HEMOGLOBIN GLYCOSYLATED A1C: CPT

## 2025-08-02 ENCOUNTER — PATIENT MESSAGE (OUTPATIENT)
Dept: INTERNAL MEDICINE CLINIC | Facility: CLINIC | Age: 45
End: 2025-08-02

## 2025-08-04 RX ORDER — DULAGLUTIDE 4.5 MG/.5ML
4.5 INJECTION, SOLUTION SUBCUTANEOUS WEEKLY
Qty: 2 ML | Refills: 1 | Status: SHIPPED | OUTPATIENT
Start: 2025-08-04

## 2025-08-04 RX ORDER — DULAGLUTIDE 3 MG/.5ML
INJECTION, SOLUTION SUBCUTANEOUS
Qty: 2 ML | Refills: 5 | OUTPATIENT
Start: 2025-08-04

## (undated) NOTE — MR AVS SNAPSHOT
[address]  [address]  [address] [id number]  [phone]               Thank you for choosing us for your health care visit with THADDEUS Fowler.   We are glad to serve you and happy to provide you with this summary of X-rays taken.    The doctor may take a sample of mucus to check for bacteria. If you have sinusitis that keeps coming back, you may need imaging tests such as X-rays or CAT scans.  This will help your doctor check for a structural problem that may be causin These medications were sent to [facility]  [address] [facility] [place]note 2], [address] [place] [address] AT [facility], [phone], [address] [place] [address],[facility], [facility] [address]     Phone:  [phone]    - Amoxicillin-Pot Clavulanate Tips for increasing your physical activity – Adults who are physically active are less likely to develop some chronic diseases than adults who are inactive.      HOW TO GET STARTED: HOW TO STAY MOTIVATED:   Start activities slowly and build up over time Do